# Patient Record
Sex: FEMALE | Race: WHITE | NOT HISPANIC OR LATINO
[De-identification: names, ages, dates, MRNs, and addresses within clinical notes are randomized per-mention and may not be internally consistent; named-entity substitution may affect disease eponyms.]

---

## 2017-01-14 ENCOUNTER — APPOINTMENT (OUTPATIENT)
Dept: INTERNAL MEDICINE | Facility: CLINIC | Age: 22
End: 2017-01-14

## 2017-01-19 ENCOUNTER — RX RENEWAL (OUTPATIENT)
Age: 22
End: 2017-01-19

## 2017-02-18 ENCOUNTER — APPOINTMENT (OUTPATIENT)
Dept: INTERNAL MEDICINE | Facility: CLINIC | Age: 22
End: 2017-02-18

## 2017-02-18 VITALS
TEMPERATURE: 98.6 F | HEIGHT: 67 IN | HEART RATE: 90 BPM | OXYGEN SATURATION: 99 % | RESPIRATION RATE: 12 BRPM | BODY MASS INDEX: 18.83 KG/M2 | DIASTOLIC BLOOD PRESSURE: 60 MMHG | WEIGHT: 120 LBS | SYSTOLIC BLOOD PRESSURE: 100 MMHG

## 2017-02-20 LAB
25(OH)D3 SERPL-MCNC: 40.3 NG/ML
ALBUMIN SERPL ELPH-MCNC: 4.9 G/DL
ALP BLD-CCNC: 43 U/L
ALT SERPL-CCNC: 11 U/L
ANION GAP SERPL CALC-SCNC: 13 MMOL/L
APPEARANCE: CLEAR
AST SERPL-CCNC: 14 U/L
BASOPHILS # BLD AUTO: 0.05 K/UL
BASOPHILS NFR BLD AUTO: 0.8 %
BILIRUB SERPL-MCNC: 0.9 MG/DL
BILIRUBIN URINE: NEGATIVE
BLOOD URINE: NEGATIVE
BUN SERPL-MCNC: 15 MG/DL
CALCIUM SERPL-MCNC: 9.7 MG/DL
CHLORIDE SERPL-SCNC: 104 MMOL/L
CHOLEST SERPL-MCNC: 142 MG/DL
CHOLEST/HDLC SERPL: 2 RATIO
CO2 SERPL-SCNC: 26 MMOL/L
COLOR: YELLOW
CREAT SERPL-MCNC: 0.67 MG/DL
EOSINOPHIL # BLD AUTO: 0.26 K/UL
EOSINOPHIL NFR BLD AUTO: 4.3 %
FOLATE SERPL-MCNC: 15.1 NG/ML
GLUCOSE QUALITATIVE U: NORMAL MG/DL
GLUCOSE SERPL-MCNC: 55 MG/DL
HBA1C MFR BLD HPLC: 5.1 %
HCT VFR BLD CALC: 42.2 %
HDLC SERPL-MCNC: 70 MG/DL
HGB BLD-MCNC: 14.2 G/DL
IMM GRANULOCYTES NFR BLD AUTO: 0.2 %
KETONES URINE: NEGATIVE
LDLC SERPL CALC-MCNC: 60 MG/DL
LEUKOCYTE ESTERASE URINE: NEGATIVE
LYMPHOCYTES # BLD AUTO: 2.34 K/UL
LYMPHOCYTES NFR BLD AUTO: 38.4 %
MAN DIFF?: NORMAL
MCHC RBC-ENTMCNC: 31.3 PG
MCHC RBC-ENTMCNC: 33.6 GM/DL
MCV RBC AUTO: 93.2 FL
MONOCYTES # BLD AUTO: 0.43 K/UL
MONOCYTES NFR BLD AUTO: 7.1 %
NEUTROPHILS # BLD AUTO: 3 K/UL
NEUTROPHILS NFR BLD AUTO: 49.2 %
NITRITE URINE: NEGATIVE
PH URINE: 7.5
PLATELET # BLD AUTO: 181 K/UL
POTASSIUM SERPL-SCNC: 4.4 MMOL/L
PROT SERPL-MCNC: 7.2 G/DL
PROTEIN URINE: NEGATIVE MG/DL
RBC # BLD: 4.53 M/UL
RBC # FLD: 12.3 %
SODIUM SERPL-SCNC: 143 MMOL/L
SPECIFIC GRAVITY URINE: 1.01
TRIGL SERPL-MCNC: 61 MG/DL
TSH SERPL-ACNC: 0.59 UIU/ML
URATE SERPL-MCNC: 3.6 MG/DL
UROBILINOGEN URINE: NORMAL MG/DL
VIT B12 SERPL-MCNC: 340 PG/ML
WBC # FLD AUTO: 6.09 K/UL

## 2017-06-12 ENCOUNTER — APPOINTMENT (OUTPATIENT)
Dept: INTERNAL MEDICINE | Facility: CLINIC | Age: 22
End: 2017-06-12

## 2017-06-12 VITALS
HEIGHT: 67 IN | BODY MASS INDEX: 18.83 KG/M2 | DIASTOLIC BLOOD PRESSURE: 60 MMHG | RESPIRATION RATE: 14 BRPM | WEIGHT: 120 LBS | SYSTOLIC BLOOD PRESSURE: 100 MMHG | TEMPERATURE: 98 F

## 2017-06-12 DIAGNOSIS — Z11.1 ENCOUNTER FOR SCREENING FOR RESPIRATORY TUBERCULOSIS: ICD-10-CM

## 2017-06-13 LAB — HBV SURFACE AB SER QL: NONREACTIVE

## 2017-06-14 LAB
MEV IGG FLD QL IA: 113 AU/ML
MEV IGG+IGM SER-IMP: POSITIVE
MUV AB SER-ACNC: NEGATIVE
MUV IGG SER QL IA: <5 AU/ML
RUBV IGG FLD-ACNC: 1.9 INDEX
RUBV IGG SER-IMP: POSITIVE
VZV AB TITR SER: POSITIVE
VZV IGG SER IF-ACNC: 1278 INDEX

## 2017-06-15 LAB
ADJUSTED MITOGEN: >10 IU/ML
ADJUSTED TB AG: 0 IU/ML
M TB IFN-G BLD-IMP: NEGATIVE
QUANTIFERON GOLD NIL: 0.02 IU/ML

## 2017-06-26 ENCOUNTER — APPOINTMENT (OUTPATIENT)
Dept: INTERNAL MEDICINE | Facility: CLINIC | Age: 22
End: 2017-06-26

## 2017-07-20 ENCOUNTER — RX RENEWAL (OUTPATIENT)
Age: 22
End: 2017-07-20

## 2017-07-20 ENCOUNTER — MEDICATION RENEWAL (OUTPATIENT)
Age: 22
End: 2017-07-20

## 2017-07-21 ENCOUNTER — MEDICATION RENEWAL (OUTPATIENT)
Age: 22
End: 2017-07-21

## 2017-09-18 ENCOUNTER — RX RENEWAL (OUTPATIENT)
Age: 22
End: 2017-09-18

## 2017-10-18 ENCOUNTER — APPOINTMENT (OUTPATIENT)
Dept: OBGYN | Facility: CLINIC | Age: 22
End: 2017-10-18

## 2017-10-19 ENCOUNTER — RX RENEWAL (OUTPATIENT)
Age: 22
End: 2017-10-19

## 2017-10-19 ENCOUNTER — TRANSCRIPTION ENCOUNTER (OUTPATIENT)
Age: 22
End: 2017-10-19

## 2017-11-07 ENCOUNTER — MEDICATION RENEWAL (OUTPATIENT)
Age: 22
End: 2017-11-07

## 2017-11-15 ENCOUNTER — APPOINTMENT (OUTPATIENT)
Dept: OBGYN | Facility: CLINIC | Age: 22
End: 2017-11-15
Payer: COMMERCIAL

## 2017-11-15 ENCOUNTER — LABORATORY RESULT (OUTPATIENT)
Age: 22
End: 2017-11-15

## 2017-11-15 VITALS
WEIGHT: 118 LBS | HEIGHT: 67 IN | BODY MASS INDEX: 18.52 KG/M2 | DIASTOLIC BLOOD PRESSURE: 72 MMHG | SYSTOLIC BLOOD PRESSURE: 115 MMHG

## 2017-11-15 PROCEDURE — 99395 PREV VISIT EST AGE 18-39: CPT

## 2017-11-17 LAB
C TRACH RRNA SPEC QL NAA+PROBE: NOT DETECTED
N GONORRHOEA RRNA SPEC QL NAA+PROBE: NOT DETECTED
SOURCE TP AMPLIFICATION: NORMAL

## 2017-11-18 ENCOUNTER — APPOINTMENT (OUTPATIENT)
Dept: INTERNAL MEDICINE | Facility: CLINIC | Age: 22
End: 2017-11-18

## 2017-11-19 ENCOUNTER — RX RENEWAL (OUTPATIENT)
Age: 22
End: 2017-11-19

## 2017-11-24 LAB — CYTOLOGY CVX/VAG DOC THIN PREP: NORMAL

## 2017-12-27 ENCOUNTER — APPOINTMENT (OUTPATIENT)
Dept: OBGYN | Facility: CLINIC | Age: 22
End: 2017-12-27
Payer: COMMERCIAL

## 2017-12-27 PROCEDURE — 57454 BX/CURETT OF CERVIX W/SCOPE: CPT

## 2018-01-02 ENCOUNTER — RX RENEWAL (OUTPATIENT)
Age: 23
End: 2018-01-02

## 2018-01-04 LAB — CORE LAB BIOPSY: NORMAL

## 2018-02-01 ENCOUNTER — RX RENEWAL (OUTPATIENT)
Age: 23
End: 2018-02-01

## 2018-03-01 ENCOUNTER — RX RENEWAL (OUTPATIENT)
Age: 23
End: 2018-03-01

## 2018-04-02 ENCOUNTER — RX RENEWAL (OUTPATIENT)
Age: 23
End: 2018-04-02

## 2018-05-09 ENCOUNTER — MEDICATION RENEWAL (OUTPATIENT)
Age: 23
End: 2018-05-09

## 2018-05-17 ENCOUNTER — MEDICATION RENEWAL (OUTPATIENT)
Age: 23
End: 2018-05-17

## 2018-05-31 ENCOUNTER — APPOINTMENT (OUTPATIENT)
Dept: NEUROLOGY | Facility: CLINIC | Age: 23
End: 2018-05-31
Payer: COMMERCIAL

## 2018-05-31 VITALS
SYSTOLIC BLOOD PRESSURE: 114 MMHG | HEIGHT: 67 IN | DIASTOLIC BLOOD PRESSURE: 67 MMHG | BODY MASS INDEX: 18.05 KG/M2 | WEIGHT: 115 LBS | HEART RATE: 58 BPM

## 2018-05-31 PROCEDURE — 99214 OFFICE O/P EST MOD 30 MIN: CPT

## 2018-06-02 ENCOUNTER — APPOINTMENT (OUTPATIENT)
Dept: INTERNAL MEDICINE | Facility: CLINIC | Age: 23
End: 2018-06-02

## 2018-06-14 ENCOUNTER — APPOINTMENT (OUTPATIENT)
Dept: OBGYN | Facility: CLINIC | Age: 23
End: 2018-06-14

## 2018-06-18 ENCOUNTER — APPOINTMENT (OUTPATIENT)
Dept: INTERNAL MEDICINE | Facility: CLINIC | Age: 23
End: 2018-06-18

## 2018-06-25 ENCOUNTER — APPOINTMENT (OUTPATIENT)
Dept: INTERNAL MEDICINE | Facility: CLINIC | Age: 23
End: 2018-06-25

## 2018-06-28 ENCOUNTER — APPOINTMENT (OUTPATIENT)
Dept: INTERNAL MEDICINE | Facility: CLINIC | Age: 23
End: 2018-06-28
Payer: MEDICAID

## 2018-06-28 VITALS
OXYGEN SATURATION: 98 % | HEART RATE: 69 BPM | WEIGHT: 120 LBS | TEMPERATURE: 98 F | RESPIRATION RATE: 14 BRPM | BODY MASS INDEX: 18.83 KG/M2 | HEIGHT: 67 IN | SYSTOLIC BLOOD PRESSURE: 100 MMHG | DIASTOLIC BLOOD PRESSURE: 60 MMHG

## 2018-06-28 PROCEDURE — 99213 OFFICE O/P EST LOW 20 MIN: CPT

## 2018-06-28 RX ORDER — DOXYCYCLINE HYCLATE 100 MG/1
100 TABLET ORAL
Qty: 20 | Refills: 0 | Status: DISCONTINUED | COMMUNITY
Start: 2017-08-17 | End: 2018-06-28

## 2018-06-28 RX ORDER — ESCITALOPRAM OXALATE 5 MG/1
5 TABLET ORAL
Qty: 30 | Refills: 0 | Status: DISCONTINUED | COMMUNITY
Start: 2016-12-16 | End: 2018-06-28

## 2018-06-28 NOTE — PHYSICAL EXAM
[No Acute Distress] : no acute distress [Well Nourished] : well nourished [Well Developed] : well developed [Well-Appearing] : well-appearing [No Respiratory Distress] : no respiratory distress  [Clear to Auscultation] : lungs were clear to auscultation bilaterally [Normal Rate] : normal rate  [Regular Rhythm] : with a regular rhythm [No Edema] : there was no peripheral edema [Soft] : abdomen soft [Non Tender] : non-tender [Normal Affect] : the affect was normal [Normal Insight/Judgement] : insight and judgment were intact

## 2018-06-28 NOTE — HISTORY OF PRESENT ILLNESS
[Friend] : friend [FreeTextEntry1] : requests labs [de-identified] : Pt is here for anxiety. was seeing a therapist and last seen in April but was not happy with her. Lexapro is not helping her. c/o stress with her boyfriend completing 2 months of rehab and stress at work. \par \par h/o seizures and followed by neurology.

## 2018-06-29 LAB
ALBUMIN SERPL ELPH-MCNC: 5.1 G/DL
ALP BLD-CCNC: 46 U/L
ALT SERPL-CCNC: 13 U/L
ANION GAP SERPL CALC-SCNC: 15 MMOL/L
AST SERPL-CCNC: 20 U/L
BASOPHILS # BLD AUTO: 0.03 K/UL
BASOPHILS NFR BLD AUTO: 0.5 %
BILIRUB SERPL-MCNC: 0.8 MG/DL
BUN SERPL-MCNC: 15 MG/DL
CALCIUM SERPL-MCNC: 10 MG/DL
CHLORIDE SERPL-SCNC: 100 MMOL/L
CO2 SERPL-SCNC: 25 MMOL/L
CREAT SERPL-MCNC: 0.73 MG/DL
EOSINOPHIL # BLD AUTO: 0.2 K/UL
EOSINOPHIL NFR BLD AUTO: 3.4 %
GLUCOSE SERPL-MCNC: 73 MG/DL
HCT VFR BLD CALC: 42.6 %
HGB BLD-MCNC: 13.9 G/DL
HIV1+2 AB SPEC QL IA.RAPID: NONREACTIVE
IMM GRANULOCYTES NFR BLD AUTO: 0.2 %
LYMPHOCYTES # BLD AUTO: 2.27 K/UL
LYMPHOCYTES NFR BLD AUTO: 38.1 %
MAN DIFF?: NORMAL
MCHC RBC-ENTMCNC: 30.5 PG
MCHC RBC-ENTMCNC: 32.6 GM/DL
MCV RBC AUTO: 93.4 FL
MONOCYTES # BLD AUTO: 0.68 K/UL
MONOCYTES NFR BLD AUTO: 11.4 %
NEUTROPHILS # BLD AUTO: 2.77 K/UL
NEUTROPHILS NFR BLD AUTO: 46.4 %
PLATELET # BLD AUTO: 213 K/UL
POTASSIUM SERPL-SCNC: 4.6 MMOL/L
PROT SERPL-MCNC: 8.3 G/DL
RBC # BLD: 4.56 M/UL
RBC # FLD: 12.7 %
SODIUM SERPL-SCNC: 140 MMOL/L
WBC # FLD AUTO: 5.96 K/UL

## 2018-06-30 LAB — TSH SERPL-ACNC: 0.76 UIU/ML

## 2018-07-16 ENCOUNTER — APPOINTMENT (OUTPATIENT)
Dept: INTERNAL MEDICINE | Facility: CLINIC | Age: 23
End: 2018-07-16

## 2018-08-02 ENCOUNTER — APPOINTMENT (OUTPATIENT)
Dept: OBGYN | Facility: CLINIC | Age: 23
End: 2018-08-02
Payer: MEDICAID

## 2018-08-02 VITALS
DIASTOLIC BLOOD PRESSURE: 70 MMHG | HEIGHT: 67 IN | WEIGHT: 120 LBS | BODY MASS INDEX: 18.83 KG/M2 | SYSTOLIC BLOOD PRESSURE: 122 MMHG

## 2018-08-02 DIAGNOSIS — N89.8 OTHER SPECIFIED NONINFLAMMATORY DISORDERS OF VAGINA: ICD-10-CM

## 2018-08-02 PROCEDURE — 99213 OFFICE O/P EST LOW 20 MIN: CPT

## 2018-08-03 LAB
C TRACH RRNA SPEC QL NAA+PROBE: NOT DETECTED
N GONORRHOEA RRNA SPEC QL NAA+PROBE: NOT DETECTED
SOURCE AMPLIFICATION: NORMAL

## 2018-08-06 ENCOUNTER — ASOB RESULT (OUTPATIENT)
Age: 23
End: 2018-08-06

## 2018-08-06 ENCOUNTER — APPOINTMENT (OUTPATIENT)
Dept: OBGYN | Facility: CLINIC | Age: 23
End: 2018-08-06
Payer: MEDICAID

## 2018-08-06 LAB
CANDIDA VAG CYTO: NOT DETECTED
G VAGINALIS+PREV SP MTYP VAG QL MICRO: DETECTED
T VAGINALIS VAG QL WET PREP: NOT DETECTED

## 2018-08-06 PROCEDURE — 76830 TRANSVAGINAL US NON-OB: CPT

## 2018-08-07 ENCOUNTER — OTHER (OUTPATIENT)
Age: 23
End: 2018-08-07

## 2018-09-18 ENCOUNTER — RX RENEWAL (OUTPATIENT)
Age: 23
End: 2018-09-18

## 2018-11-05 ENCOUNTER — RX RENEWAL (OUTPATIENT)
Age: 23
End: 2018-11-05

## 2018-12-13 ENCOUNTER — APPOINTMENT (OUTPATIENT)
Dept: INTERNAL MEDICINE | Facility: CLINIC | Age: 23
End: 2018-12-13

## 2018-12-19 ENCOUNTER — APPOINTMENT (OUTPATIENT)
Dept: INTERNAL MEDICINE | Facility: CLINIC | Age: 23
End: 2018-12-19

## 2018-12-26 ENCOUNTER — RX RENEWAL (OUTPATIENT)
Age: 23
End: 2018-12-26

## 2019-01-03 ENCOUNTER — APPOINTMENT (OUTPATIENT)
Dept: OBGYN | Facility: CLINIC | Age: 24
End: 2019-01-03

## 2019-04-03 ENCOUNTER — RX RENEWAL (OUTPATIENT)
Age: 24
End: 2019-04-03

## 2019-05-14 ENCOUNTER — RX RENEWAL (OUTPATIENT)
Age: 24
End: 2019-05-14

## 2019-05-24 ENCOUNTER — LABORATORY RESULT (OUTPATIENT)
Age: 24
End: 2019-05-24

## 2019-05-24 ENCOUNTER — APPOINTMENT (OUTPATIENT)
Dept: INTERNAL MEDICINE | Facility: CLINIC | Age: 24
End: 2019-05-24
Payer: MEDICAID

## 2019-05-24 VITALS
HEART RATE: 70 BPM | DIASTOLIC BLOOD PRESSURE: 62 MMHG | WEIGHT: 127 LBS | OXYGEN SATURATION: 98 % | TEMPERATURE: 97.9 F | BODY MASS INDEX: 19.93 KG/M2 | HEIGHT: 67 IN | SYSTOLIC BLOOD PRESSURE: 106 MMHG | RESPIRATION RATE: 14 BRPM

## 2019-05-24 DIAGNOSIS — R10.13 EPIGASTRIC PAIN: ICD-10-CM

## 2019-05-24 PROCEDURE — 99395 PREV VISIT EST AGE 18-39: CPT | Mod: 25

## 2019-05-24 PROCEDURE — 36415 COLL VENOUS BLD VENIPUNCTURE: CPT

## 2019-05-24 RX ORDER — MUPIROCIN 20 MG/G
2 OINTMENT TOPICAL
Qty: 22 | Refills: 0 | Status: DISCONTINUED | COMMUNITY
Start: 2017-12-04 | End: 2019-05-24

## 2019-05-24 RX ORDER — ONDANSETRON 4 MG/1
4 TABLET, ORALLY DISINTEGRATING ORAL
Qty: 9 | Refills: 0 | Status: DISCONTINUED | COMMUNITY
Start: 2017-10-19 | End: 2019-05-24

## 2019-05-24 NOTE — HEALTH RISK ASSESSMENT
[] : Yes [de-identified] : trying to quit - down to 2 cigs daily [de-identified] : marijuana [Change in mental status noted] : No change in mental status noted [Language] : denies difficulty with language [None] : None [With Family] : lives with family [Single] : single [Employed] : employed [Fully functional (bathing, dressing, toileting, transferring, walking, feeding)] : Fully functional (bathing, dressing, toileting, transferring, walking, feeding) [Feels Safe at Home] : Feels safe at home [Reports changes in hearing] : Reports no changes in hearing [Fully functional (using the telephone, shopping, preparing meals, housekeeping, doing laundry, using] : Fully functional and needs no help or supervision to perform IADLs (using the telephone, shopping, preparing meals, housekeeping, doing laundry, using transportation, managing medications and managing finances) [Reports changes in dental health] : Reports no changes in dental health [Reports changes in vision] : Reports no changes in vision

## 2019-05-24 NOTE — HISTORY OF PRESENT ILLNESS
[de-identified] : c/o epigastric pain for the last 6 months. Worse after she eats certain foods like spicy or greasy. Tums did not help. Getting worse. It is sore at night. No getting periods b/o IUD. She is going to have it removed because she doesn't like how she feels. drinks 3-4 drinks on weekends. \par \par Has not had a seizure for 4 yrs.

## 2019-05-24 NOTE — PHYSICAL EXAM
[No Acute Distress] : no acute distress [Well Developed] : well developed [Well Nourished] : well nourished [Well-Appearing] : well-appearing [Normal Sclera/Conjunctiva] : normal sclera/conjunctiva [PERRL] : pupils equal round and reactive to light [EOMI] : extraocular movements intact [Normal Outer Ear/Nose] : the outer ears and nose were normal in appearance [Normal Oropharynx] : the oropharynx was normal [No JVD] : no jugular venous distention [Supple] : supple [Thyroid Normal, No Nodules] : the thyroid was normal and there were no nodules present [No Lymphadenopathy] : no lymphadenopathy [No Respiratory Distress] : no respiratory distress  [Clear to Auscultation] : lungs were clear to auscultation bilaterally [No Accessory Muscle Use] : no accessory muscle use [Normal Rate] : normal rate  [Regular Rhythm] : with a regular rhythm [Normal S1, S2] : normal S1 and S2 [No Murmur] : no murmur heard [No Carotid Bruits] : no carotid bruits [No Abdominal Bruit] : a ~M bruit was not heard ~T in the abdomen [Pedal Pulses Present] : the pedal pulses are present [No Varicosities] : no varicosities [No Extremity Clubbing/Cyanosis] : no extremity clubbing/cyanosis [No Edema] : there was no peripheral edema [Soft] : abdomen soft [No Palpable Aorta] : no palpable aorta [Non Tender] : non-tender [Non-distended] : non-distended [No HSM] : no HSM [No Masses] : no abdominal mass palpated [Normal Posterior Cervical Nodes] : no posterior cervical lymphadenopathy [Normal Bowel Sounds] : normal bowel sounds [Normal Anterior Cervical Nodes] : no anterior cervical lymphadenopathy [No CVA Tenderness] : no CVA  tenderness [No Joint Swelling] : no joint swelling [No Spinal Tenderness] : no spinal tenderness [Grossly Normal Strength/Tone] : grossly normal strength/tone [No Rash] : no rash [Coordination Grossly Intact] : coordination grossly intact [Normal Gait] : normal gait [No Focal Deficits] : no focal deficits [Deep Tendon Reflexes (DTR)] : deep tendon reflexes were 2+ and symmetric [Normal Insight/Judgement] : insight and judgment were intact [Normal Affect] : the affect was normal

## 2019-05-25 LAB
25(OH)D3 SERPL-MCNC: 26.8 NG/ML
ALBUMIN SERPL ELPH-MCNC: 4.8 G/DL
ALP BLD-CCNC: 41 U/L
ALT SERPL-CCNC: 6 U/L
ANION GAP SERPL CALC-SCNC: 13 MMOL/L
APPEARANCE: CLEAR
AST SERPL-CCNC: 11 U/L
BASOPHILS # BLD AUTO: 0.06 K/UL
BASOPHILS NFR BLD AUTO: 0.7 %
BILIRUB SERPL-MCNC: 0.9 MG/DL
BILIRUBIN URINE: NEGATIVE
BLOOD URINE: NEGATIVE
BUN SERPL-MCNC: 16 MG/DL
CALCIUM SERPL-MCNC: 9.4 MG/DL
CHLORIDE SERPL-SCNC: 103 MMOL/L
CHOLEST SERPL-MCNC: 159 MG/DL
CHOLEST/HDLC SERPL: 2.3 RATIO
CO2 SERPL-SCNC: 24 MMOL/L
COLOR: YELLOW
CREAT SERPL-MCNC: 0.64 MG/DL
EOSINOPHIL # BLD AUTO: 0.21 K/UL
EOSINOPHIL NFR BLD AUTO: 2.6 %
ESTIMATED AVERAGE GLUCOSE: 88 MG/DL
FOLATE SERPL-MCNC: 18.2 NG/ML
GLUCOSE QUALITATIVE U: NEGATIVE
GLUCOSE SERPL-MCNC: 75 MG/DL
HBA1C MFR BLD HPLC: 4.7 %
HCT VFR BLD CALC: 40.2 %
HDLC SERPL-MCNC: 68 MG/DL
HGB BLD-MCNC: 13.4 G/DL
HIV1+2 AB SPEC QL IA.RAPID: NONREACTIVE
IMM GRANULOCYTES NFR BLD AUTO: 0.1 %
KETONES URINE: ABNORMAL
LDLC SERPL CALC-MCNC: 78 MG/DL
LEUKOCYTE ESTERASE URINE: NEGATIVE
LYMPHOCYTES # BLD AUTO: 1.91 K/UL
LYMPHOCYTES NFR BLD AUTO: 23.8 %
MAN DIFF?: NORMAL
MCHC RBC-ENTMCNC: 31 PG
MCHC RBC-ENTMCNC: 33.3 GM/DL
MCV RBC AUTO: 93.1 FL
MONOCYTES # BLD AUTO: 0.61 K/UL
MONOCYTES NFR BLD AUTO: 7.6 %
NEUTROPHILS # BLD AUTO: 5.22 K/UL
NEUTROPHILS NFR BLD AUTO: 65.2 %
NITRITE URINE: NEGATIVE
PH URINE: 6.5
PLATELET # BLD AUTO: 182 K/UL
POTASSIUM SERPL-SCNC: 4.5 MMOL/L
PROT SERPL-MCNC: 7 G/DL
PROTEIN URINE: ABNORMAL
RBC # BLD: 4.32 M/UL
RBC # FLD: 11.5 %
SODIUM SERPL-SCNC: 140 MMOL/L
SPECIFIC GRAVITY URINE: 1.03
TRIGL SERPL-MCNC: 67 MG/DL
TSH SERPL-ACNC: 0.86 UIU/ML
URATE SERPL-MCNC: 4.3 MG/DL
UROBILINOGEN URINE: NORMAL
VIT B12 SERPL-MCNC: 365 PG/ML
WBC # FLD AUTO: 8.02 K/UL

## 2019-06-17 ENCOUNTER — APPOINTMENT (OUTPATIENT)
Dept: OBGYN | Facility: CLINIC | Age: 24
End: 2019-06-17

## 2019-06-20 ENCOUNTER — RX RENEWAL (OUTPATIENT)
Age: 24
End: 2019-06-20

## 2019-06-20 ENCOUNTER — MEDICATION RENEWAL (OUTPATIENT)
Age: 24
End: 2019-06-20

## 2019-07-11 ENCOUNTER — APPOINTMENT (OUTPATIENT)
Dept: OBGYN | Facility: CLINIC | Age: 24
End: 2019-07-11
Payer: MEDICAID

## 2019-07-11 DIAGNOSIS — Z30.432 ENCOUNTER FOR REMOVAL OF INTRAUTERINE CONTRACEPTIVE DEVICE: ICD-10-CM

## 2019-07-11 PROCEDURE — 58301 REMOVE INTRAUTERINE DEVICE: CPT

## 2019-07-11 NOTE — PROCEDURE
[IUD Removal] : IUD [Patient] : patient [Speculum Placed] : a speculum was placed in the vagina [Strings Exposed - Forceps] : forceps were placed in the endocervical canal to expose the strings [IUD Discarded] : discarded [Tolerated Well] : the patient tolerated the procedure well [de-identified] : mood lability

## 2019-07-17 ENCOUNTER — RX RENEWAL (OUTPATIENT)
Age: 24
End: 2019-07-17

## 2019-08-15 ENCOUNTER — RX RENEWAL (OUTPATIENT)
Age: 24
End: 2019-08-15

## 2019-09-11 ENCOUNTER — APPOINTMENT (OUTPATIENT)
Dept: OBGYN | Facility: CLINIC | Age: 24
End: 2019-09-11
Payer: MEDICAID

## 2019-09-11 VITALS
SYSTOLIC BLOOD PRESSURE: 116 MMHG | BODY MASS INDEX: 19.93 KG/M2 | HEIGHT: 67 IN | WEIGHT: 127 LBS | DIASTOLIC BLOOD PRESSURE: 73 MMHG

## 2019-09-11 DIAGNOSIS — Z87.42 PERSONAL HISTORY OF OTHER DISEASES OF THE FEMALE GENITAL TRACT: ICD-10-CM

## 2019-09-11 PROCEDURE — 99213 OFFICE O/P EST LOW 20 MIN: CPT

## 2019-09-16 ENCOUNTER — RX RENEWAL (OUTPATIENT)
Age: 24
End: 2019-09-16

## 2019-09-17 ENCOUNTER — APPOINTMENT (OUTPATIENT)
Dept: OBGYN | Facility: CLINIC | Age: 24
End: 2019-09-17

## 2019-09-23 ENCOUNTER — RX RENEWAL (OUTPATIENT)
Age: 24
End: 2019-09-23

## 2019-09-30 ENCOUNTER — APPOINTMENT (OUTPATIENT)
Dept: OBGYN | Facility: CLINIC | Age: 24
End: 2019-09-30

## 2019-11-14 ENCOUNTER — APPOINTMENT (OUTPATIENT)
Dept: INTERNAL MEDICINE | Facility: CLINIC | Age: 24
End: 2019-11-14

## 2019-11-29 ENCOUNTER — RX RENEWAL (OUTPATIENT)
Age: 24
End: 2019-11-29

## 2019-12-09 ENCOUNTER — RX RENEWAL (OUTPATIENT)
Age: 24
End: 2019-12-09

## 2019-12-10 ENCOUNTER — APPOINTMENT (OUTPATIENT)
Dept: NEUROLOGY | Facility: CLINIC | Age: 24
End: 2019-12-10

## 2020-02-12 ENCOUNTER — RX RENEWAL (OUTPATIENT)
Age: 25
End: 2020-02-12

## 2020-03-08 ENCOUNTER — RX RENEWAL (OUTPATIENT)
Age: 25
End: 2020-03-08

## 2020-04-01 ENCOUNTER — RESULT CHARGE (OUTPATIENT)
Age: 25
End: 2020-04-01

## 2020-04-01 ENCOUNTER — APPOINTMENT (OUTPATIENT)
Dept: OBGYN | Facility: CLINIC | Age: 25
End: 2020-04-01
Payer: MEDICAID

## 2020-04-01 PROCEDURE — 81003 URINALYSIS AUTO W/O SCOPE: CPT | Mod: QW

## 2020-04-01 PROCEDURE — 99211 OFF/OP EST MAY X REQ PHY/QHP: CPT

## 2020-04-02 LAB
BILIRUB UR QL STRIP: NORMAL
GLUCOSE UR-MCNC: NORMAL
HCG UR QL: 0.2 EU/DL
HGB UR QL STRIP.AUTO: NORMAL
KETONES UR-MCNC: NORMAL
LEUKOCYTE ESTERASE UR QL STRIP: NORMAL
NITRITE UR QL STRIP: NORMAL
PH UR STRIP: 6
PROT UR STRIP-MCNC: NORMAL
SP GR UR STRIP: >=1.03

## 2020-04-03 LAB — BACTERIA UR CULT: NORMAL

## 2020-04-06 ENCOUNTER — RX RENEWAL (OUTPATIENT)
Age: 25
End: 2020-04-06

## 2020-05-07 ENCOUNTER — RX RENEWAL (OUTPATIENT)
Age: 25
End: 2020-05-07

## 2020-05-11 ENCOUNTER — APPOINTMENT (OUTPATIENT)
Dept: INTERNAL MEDICINE | Facility: CLINIC | Age: 25
End: 2020-05-11
Payer: MEDICAID

## 2020-05-11 DIAGNOSIS — K21.9 GASTRO-ESOPHAGEAL REFLUX DISEASE W/OUT ESOPHAGITIS: ICD-10-CM

## 2020-05-11 PROCEDURE — 99442: CPT

## 2020-05-11 RX ORDER — OMEPRAZOLE 40 MG/1
40 CAPSULE, DELAYED RELEASE ORAL
Qty: 30 | Refills: 2 | Status: DISCONTINUED | COMMUNITY
Start: 2017-11-17 | End: 2020-05-11

## 2020-05-11 RX ORDER — METRONIDAZOLE 500 MG/1
500 TABLET ORAL TWICE DAILY
Qty: 14 | Refills: 0 | Status: DISCONTINUED | COMMUNITY
Start: 2018-08-06 | End: 2020-05-11

## 2020-05-11 NOTE — HISTORY OF PRESENT ILLNESS
[FreeTextEntry1] : anxiety and depression [Verbal consent obtained from patient] : the patient, [unfilled] [de-identified] : Pt has anxiety and depression\par \par Has gerd and omeprazole not working as well and she has to take tums. Also has IBS. When she eats anything acidic she gets heartburn. Her mother takes pantoprazole and pt would like to be switched to that. She has not been to GI. \par \par She also has anxiety and taking lexapro which she needs refilled. She has been taking 20 mg. She does to a therapist through telemedicine. Last time was about 2 weeks ago. \par \par She sees a neurologist for her seizure disorder and she takes keppra.

## 2020-06-01 ENCOUNTER — RX RENEWAL (OUTPATIENT)
Age: 25
End: 2020-06-01

## 2020-06-05 ENCOUNTER — RX RENEWAL (OUTPATIENT)
Age: 25
End: 2020-06-05

## 2020-07-01 ENCOUNTER — RX RENEWAL (OUTPATIENT)
Age: 25
End: 2020-07-01

## 2020-07-03 ENCOUNTER — RX RENEWAL (OUTPATIENT)
Age: 25
End: 2020-07-03

## 2020-07-07 ENCOUNTER — RX RENEWAL (OUTPATIENT)
Age: 25
End: 2020-07-07

## 2020-07-10 ENCOUNTER — RX RENEWAL (OUTPATIENT)
Age: 25
End: 2020-07-10

## 2020-07-13 ENCOUNTER — APPOINTMENT (OUTPATIENT)
Dept: NEUROLOGY | Facility: CLINIC | Age: 25
End: 2020-07-13

## 2020-07-24 ENCOUNTER — APPOINTMENT (OUTPATIENT)
Dept: NEUROLOGY | Facility: CLINIC | Age: 25
End: 2020-07-24

## 2020-07-24 NOTE — DISCUSSION/SUMMARY
[Medically Refractory (seizure within the last year)] : Medically Refractory (seizure within the last year) [Risks Associated with Driving/[place] Law] : As per my usual protocol, the patient was advised in regards to risks and driving privileges associated with the [place] Guidelines.  [Compliance with Medications] : The importance of compliance with medications was reinforced. [Safety Recommendations] : The patient was advised in regards to the risk of seizures and general seizure safety recommendations including not to be bathing alone, climbing to high places and operating heavy machinery. [Medication Side Effects] : High frequency and serious potential medication adverse effects were reviewed with the patient, including but not exclusive to psychiatric effects.  Information sheets on medication side effects were made available to the patient in our clinic.  The patient or advocate agrees to notify us for any concerns. [Teratogenicity] : Risks associated with AED use in pregnancy, teratogenicity and methods of contraception were discussed.  [FreeTextEntry1] : Patient with history of seizures as  a child and recent GTCs one at least in setting of benzo withdrawal. \par last event Feb 2015 \par mild mood effects from Keppra according to mother\par difficulty staying asleep (some anxiety)\par currently in substance abuse program (Anorexia in last year of highs school) related to break up with patient)\par sexually active (on OCP) does not use baririer (counselled on safe sex)\par \par Plan:\par started Lexapro 5mg (titrating up to 10mg in 2 weeks (sustabnce abuseProgram)\par counselled on ETOH/ substance abuse (may have  reach out)\par - reviewed seizure triggers\par -continue Keppra \par -on OCP\par -continue Folic acid 2mg daily\par - relaxation techniques discussed to help with anxiety/stress\par - annual labs\par -ambu eeg to r/o subclinical sz ( for rdriving)\par - follow up in 4-6  months\par \par

## 2020-07-24 NOTE — HISTORY OF PRESENT ILLNESS
[Febrile Seizures] : febrile seizures [FreeTextEntry1] : ***UPDATE:7/24/20***\par Appointment was conducted by phone at request of patient in place of a follow up office visit due to heightened concern for Corona virus infection risk.\par Verbal consent given on Jul 24, 2020 10:08 AM by the patient Ms. CATRACHITO SAINZ Jul 23 1995 who understands that the telephone visit will be charged to insurance and may involve co-pay for patient\par Nurse Practitioner location:home\par Patient location:home\par Individuals on call: DARIUS Pedro, Ms. CATRACHITO SAINZ\par \par Ms Catrachito Sainz was last seen in May 2018\par \par UPDATE: 5/31/18\par Doing well, no reported seizures\par mood good on Levetiracetam\par \par The patient is 25 year-old right handed woman with seizures starting at age 1, with generalized tonic clonic seizures.  She was placed on medication at age 1, and at some point was diagnosed with petit mal around age 3, with abnormal EEG.  She started on Zarontin but was allergic.  She was tried on Dilantin, phenobarbital, neurontin, Topamax and none worked well and there were behavioral side effects.\par \par She was stable on lamictal by age 16 to the point where it was tapered last year at age 18 since she was doing well.  No EEGs were performed.  She had an MRI at [facility] but magdaleno not have result, from 2014.\par \par She had one GTC at a club with strobe lights while on Lamictal in 2012.  She feels jumpy when she looks at strobe lights.  \par \par It is unclear if she is continuing to have petit mal spells.  Her most recent convulsion was  in the setting of Xanax withdrawal and it was severe with three repetitive seizures..  In November 2014, while using drugs, but not withdrawing, she had a witnessed seizure at work and she had soft tissue injuries.   \par Last seizure in Feb 2015 \par \par She feels irritable on Keppra after this was started in November 2014, and would like to consider going back to Lamictal since she states her mood was better.\par \par She was born with umbilical cord around her neck, and she had some special education classes.  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

## 2020-07-24 NOTE — PHYSICAL EXAM
[General Appearance - Alert] : alert [General Appearance - In No Acute Distress] : in no acute distress [Oriented To Time, Place, And Person] : oriented to person, place, and time [Impaired Insight] : insight and judgment were intact [Affect] : the affect was normal [Person] : oriented to person [Place] : oriented to place [Time] : oriented to time [Short Term Intact] : short term memory intact [Registration Intact] : recent registration memory intact [Span Intact] : the attention span was normal [Remote Intact] : remote memory intact [Concentration Intact] : normal concentrating ability [Visual Intact] : visual attention was ~T not ~L decreased [Repeating Phrases] : no difficulty repeating a phrase [Naming Objects] : no difficulty naming common objects [Fluency] : fluency intact [Writing A Sentence] : no difficulty writing a sentence [Reading] : reading intact [Comprehension] : comprehension intact [Past History] : adequate knowledge of personal past history [Current Events] : adequate knowledge of current events [Cranial Nerves Optic (II)] : visual acuity intact bilaterally,  visual fields full to confrontation, pupils equal round and reactive to light [Vocabulary] : adequate range of vocabulary [Cranial Nerves Oculomotor (III)] : extraocular motion intact [Cranial Nerves Trigeminal (V)] : facial sensation intact symmetrically [Cranial Nerves Facial (VII)] : face symmetrical [Cranial Nerves Vestibulocochlear (VIII)] : hearing was intact bilaterally [Cranial Nerves Glossopharyngeal (IX)] : tongue and palate midline [Cranial Nerves Accessory (XI - Cranial And Spinal)] : head turning and shoulder shrug symmetric [Cranial Nerves Hypoglossal (XII)] : there was no tongue deviation with protrusion [Motor Tone] : muscle tone was normal in all four extremities [Motor Strength] : muscle strength was normal in all four extremities [No Muscle Atrophy] : normal bulk in all four extremities [Sensation Tactile Decrease] : light touch was intact [Sensation Pain / Temperature Decrease] : pain and temperature was intact [Proprioception] : proprioception was intact [Sensation Vibration Decrease] : vibration was intact [Abnormal Walk] : normal gait [Balance] : balance was intact [2+] : Ankle jerk left 2+ [Sclera] : the sclera and conjunctiva were normal [Extraocular Movements] : extraocular movements were intact [PERRL With Normal Accommodation] : pupils were equal in size, round, reactive to light, with normal accommodation [Outer Ear] : the ears and nose were normal in appearance [Oropharynx] : the oropharynx was normal [Past-pointing] : there was no past-pointing [Tremor] : no tremor present [Plantar Reflex Right Only] : normal on the right [Plantar Reflex Left Only] : normal on the left

## 2020-07-30 ENCOUNTER — RX RENEWAL (OUTPATIENT)
Age: 25
End: 2020-07-30

## 2020-07-31 ENCOUNTER — RX RENEWAL (OUTPATIENT)
Age: 25
End: 2020-07-31

## 2020-08-07 ENCOUNTER — APPOINTMENT (OUTPATIENT)
Dept: NEUROLOGY | Facility: CLINIC | Age: 25
End: 2020-08-07

## 2020-08-07 NOTE — DISCUSSION/SUMMARY
[FreeTextEntry1] : Patient with history of seizures as  a child and recent GTCs one at least in setting of benzo withdrawal. \par last event Feb 2015 \par mild mood effects from Keppra according to mother\par difficulty staying asleep (some anxiety)\par currently in substance abuse program (Anorexia in last year of highs school) related to break up with patient)\par sexually active (on OCP) does not use baririer (counselled on safe sex)\par \par Plan:\par started Lexapro 5mg (titrating up to 10mg in 2 weeks (sustabnce abuseProgram)\par counselled on ETOH/ substance abuse (may have  reach out)\par - reviewed seizure triggers\par -continue Keppra \par -on OCP\par -continue Folic acid 2mg daily\par - relaxation techniques discussed to help with anxiety/stress\par - annual labs\par -ambu eeg to r/o subclinical sz ( for rdriving)\par - follow up in 4-6  months\par \par   [Medically Refractory (seizure within the last year)] : Medically Refractory (seizure within the last year) [[place]isks [place] with D[place]] : As per my usual protocol, the patient was advised in regards to risks and driving privileges associated with the [place] Guidelines.  [Safety Recommendations] : The patient was advised in regards to the risk of seizures and general seizure safety recommendations including not to be bathing alone, climbing to high places and operating heavy machinery. [Medication Side Effects] : High frequency and serious potential medication adverse effects were reviewed with the patient, including but not exclusive to psychiatric effects.  Information sheets on medication side effects were made available to the patient in our clinic.  The patient or advocate agrees to notify us for any concerns. [Compliance with Medications] : The importance of compliance with medications was reinforced. [Teratogenicity] : Risks associated with AED use in pregnancy, teratogenicity and methods of contraception were discussed.

## 2020-08-07 NOTE — HISTORY OF PRESENT ILLNESS
[FreeTextEntry1] : ***UPDATE:7/24/20***\par Appointment was conducted by phone at request of patient in place of a follow up office visit due to heightened concern for Corona virus infection risk.\par Verbal consent given on Jul 24, 2020 10:08 AM by the patient Ms. CATRACHITO SAINZ Jul 23 1995 who understands that the telephone visit will be charged to insurance and may involve co-pay for patient\par Nurse Practitioner location:home\par Patient location:home\par Individuals on call: DARIUS Pedro, Ms. CATRACHITO SAINZ\par \par Ms Catrachito Sainz was last seen in May 2018\par \par UPDATE: 5/31/18\par Doing well, no reported seizures\par mood good on Levetiracetam\par \par The patient is 25 year-old right handed woman with seizures starting at age 1, with generalized tonic clonic seizures.  She was placed on medication at age 1, and at some point was diagnosed with petit mal around age 3, with abnormal EEG.  She started on Zarontin but was allergic.  She was tried on Dilantin, phenobarbital, neurontin, Topamax and none worked well and there were behavioral side effects.\par \par She was stable on lamictal by age 16 to the point where it was tapered last year at age 18 since she was doing well.  No EEGs were performed.  She had an MRI at [facility] but magdaleno not have result, from 2014.\par \par She had one GTC at a club with strobe lights while on Lamictal in 2012.  She feels jumpy when she looks at strobe lights.  \par \par It is unclear if she is continuing to have petit mal spells.  Her most recent convulsion was  in the setting of Xanax withdrawal and it was severe with three repetitive seizures..  In November 2014, while using drugs, but not withdrawing, she had a witnessed seizure at work and she had soft tissue injuries.   \par Last seizure in Feb 2015 \par \par She feels irritable on Keppra after this was started in November 2014, and would like to consider going back to Lamictal since she states her mood was better.\par \par She was born with umbilical cord around her neck, and she had some special education classes.  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  [Febrile Seizures] : febrile seizures

## 2020-08-07 NOTE — PHYSICAL EXAM
[General Appearance - Alert] : alert [General Appearance - In No Acute Distress] : in no acute distress [Impaired Insight] : insight and judgment were intact [Oriented To Time, Place, And Person] : oriented to person, place, and time [Affect] : the affect was normal [Person] : oriented to person [Place] : oriented to place [Time] : oriented to time [Remote Intact] : remote memory intact [Short Term Intact] : short term memory intact [Concentration Intact] : normal concentrating ability [Span Intact] : the attention span was normal [Registration Intact] : recent registration memory intact [Visual Intact] : visual attention was ~T not ~L decreased [Naming Objects] : no difficulty naming common objects [Repeating Phrases] : no difficulty repeating a phrase [Comprehension] : comprehension intact [Fluency] : fluency intact [Writing A Sentence] : no difficulty writing a sentence [Past History] : adequate knowledge of personal past history [Reading] : reading intact [Current Events] : adequate knowledge of current events [Cranial Nerves Optic (II)] : visual acuity intact bilaterally,  visual fields full to confrontation, pupils equal round and reactive to light [Cranial Nerves Oculomotor (III)] : extraocular motion intact [Cranial Nerves Trigeminal (V)] : facial sensation intact symmetrically [Vocabulary] : adequate range of vocabulary [Cranial Nerves Vestibulocochlear (VIII)] : hearing was intact bilaterally [Cranial Nerves Facial (VII)] : face symmetrical [Cranial Nerves Glossopharyngeal (IX)] : tongue and palate midline [Motor Tone] : muscle tone was normal in all four extremities [Cranial Nerves Hypoglossal (XII)] : there was no tongue deviation with protrusion [Cranial Nerves Accessory (XI - Cranial And Spinal)] : head turning and shoulder shrug symmetric [Motor Strength] : muscle strength was normal in all four extremities [Sensation Tactile Decrease] : light touch was intact [No Muscle Atrophy] : normal bulk in all four extremities [Sensation Pain / Temperature Decrease] : pain and temperature was intact [Abnormal Walk] : normal gait [Sensation Vibration Decrease] : vibration was intact [Balance] : balance was intact [Proprioception] : proprioception was intact [Tremor] : no tremor present [Past-pointing] : there was no past-pointing [2+] : Ankle jerk right 2+ [Plantar Reflex Right Only] : normal on the right [Plantar Reflex Left Only] : normal on the left [Sclera] : the sclera and conjunctiva were normal [PERRL With Normal Accommodation] : pupils were equal in size, round, reactive to light, with normal accommodation [Oropharynx] : the oropharynx was normal [Extraocular Movements] : extraocular movements were intact [Outer Ear] : the ears and nose were normal in appearance

## 2020-08-26 ENCOUNTER — RX RENEWAL (OUTPATIENT)
Age: 25
End: 2020-08-26

## 2020-09-05 ENCOUNTER — RX RENEWAL (OUTPATIENT)
Age: 25
End: 2020-09-05

## 2020-09-14 ENCOUNTER — RX RENEWAL (OUTPATIENT)
Age: 25
End: 2020-09-14

## 2020-09-24 ENCOUNTER — APPOINTMENT (OUTPATIENT)
Dept: INTERNAL MEDICINE | Facility: CLINIC | Age: 25
End: 2020-09-24

## 2020-09-30 ENCOUNTER — APPOINTMENT (OUTPATIENT)
Dept: OBGYN | Facility: CLINIC | Age: 25
End: 2020-09-30
Payer: MEDICAID

## 2020-09-30 VITALS
DIASTOLIC BLOOD PRESSURE: 81 MMHG | SYSTOLIC BLOOD PRESSURE: 152 MMHG | WEIGHT: 128 LBS | HEIGHT: 68 IN | BODY MASS INDEX: 19.4 KG/M2

## 2020-09-30 DIAGNOSIS — Z01.419 ENCOUNTER FOR GYNECOLOGICAL EXAMINATION (GENERAL) (ROUTINE) W/OUT ABNORMAL FINDINGS: ICD-10-CM

## 2020-09-30 PROCEDURE — 99395 PREV VISIT EST AGE 18-39: CPT

## 2020-09-30 PROCEDURE — 99212 OFFICE O/P EST SF 10 MIN: CPT | Mod: 25

## 2020-09-30 NOTE — HISTORY OF PRESENT ILLNESS
[FreeTextEntry1] : pt is a 24 y/o p0 lmp 9/22 presents for annual gyn visit with concern of fishy odor and desires std testing [Regular Cycle Intervals] : periods have been regular

## 2020-10-01 ENCOUNTER — RX RENEWAL (OUTPATIENT)
Age: 25
End: 2020-10-01

## 2020-10-01 LAB
HBV SURFACE AG SER QL: NONREACTIVE
HCV AB SER QL: NONREACTIVE
HCV S/CO RATIO: 0.08 S/CO
HIV1+2 AB SPEC QL IA.RAPID: NONREACTIVE

## 2020-10-02 LAB
C TRACH RRNA SPEC QL NAA+PROBE: NOT DETECTED
CANDIDA VAG CYTO: NOT DETECTED
G VAGINALIS+PREV SP MTYP VAG QL MICRO: DETECTED
HPV HIGH+LOW RISK DNA PNL CVX: NOT DETECTED
N GONORRHOEA RRNA SPEC QL NAA+PROBE: NOT DETECTED
SOURCE TP AMPLIFICATION: NORMAL
T PALLIDUM AB SER QL IA: NEGATIVE
T VAGINALIS VAG QL WET PREP: NOT DETECTED

## 2020-10-06 LAB — CYTOLOGY CVX/VAG DOC THIN PREP: NORMAL

## 2020-10-07 RX ORDER — DROSPIRENONE/ETHINYL ESTRADIOL/LEVOMEFOLATE CALCIUM AND LEVOMEFOLATE CALCIUM 3-0.03(21)
3-0.03-0.451 KIT ORAL
Qty: 90 | Refills: 3 | Status: DISCONTINUED | COMMUNITY
Start: 2020-09-30 | End: 2020-10-07

## 2020-10-08 ENCOUNTER — APPOINTMENT (OUTPATIENT)
Dept: INTERNAL MEDICINE | Facility: CLINIC | Age: 25
End: 2020-10-08

## 2020-10-10 ENCOUNTER — RX RENEWAL (OUTPATIENT)
Age: 25
End: 2020-10-10

## 2020-10-31 ENCOUNTER — RX RENEWAL (OUTPATIENT)
Age: 25
End: 2020-10-31

## 2020-11-10 ENCOUNTER — RX RENEWAL (OUTPATIENT)
Age: 25
End: 2020-11-10

## 2020-12-10 ENCOUNTER — RX RENEWAL (OUTPATIENT)
Age: 25
End: 2020-12-10

## 2021-01-05 ENCOUNTER — TRANSCRIPTION ENCOUNTER (OUTPATIENT)
Age: 26
End: 2021-01-05

## 2021-01-07 ENCOUNTER — RX RENEWAL (OUTPATIENT)
Age: 26
End: 2021-01-07

## 2021-01-09 ENCOUNTER — RX RENEWAL (OUTPATIENT)
Age: 26
End: 2021-01-09

## 2021-01-27 ENCOUNTER — APPOINTMENT (OUTPATIENT)
Dept: INTERNAL MEDICINE | Facility: CLINIC | Age: 26
End: 2021-01-27

## 2021-02-04 ENCOUNTER — RX RENEWAL (OUTPATIENT)
Age: 26
End: 2021-02-04

## 2021-02-13 ENCOUNTER — RX RENEWAL (OUTPATIENT)
Age: 26
End: 2021-02-13

## 2021-03-05 ENCOUNTER — RX RENEWAL (OUTPATIENT)
Age: 26
End: 2021-03-05

## 2021-03-17 ENCOUNTER — RX RENEWAL (OUTPATIENT)
Age: 26
End: 2021-03-17

## 2021-04-01 ENCOUNTER — RX RENEWAL (OUTPATIENT)
Age: 26
End: 2021-04-01

## 2021-04-28 ENCOUNTER — RX RENEWAL (OUTPATIENT)
Age: 26
End: 2021-04-28

## 2021-05-19 ENCOUNTER — RX RENEWAL (OUTPATIENT)
Age: 26
End: 2021-05-19

## 2021-05-22 ENCOUNTER — APPOINTMENT (OUTPATIENT)
Dept: INTERNAL MEDICINE | Facility: CLINIC | Age: 26
End: 2021-05-22

## 2021-06-14 ENCOUNTER — RX RENEWAL (OUTPATIENT)
Age: 26
End: 2021-06-14

## 2021-06-17 ENCOUNTER — RX RENEWAL (OUTPATIENT)
Age: 26
End: 2021-06-17

## 2021-07-09 ENCOUNTER — RX RENEWAL (OUTPATIENT)
Age: 26
End: 2021-07-09

## 2021-07-19 ENCOUNTER — APPOINTMENT (OUTPATIENT)
Dept: INTERNAL MEDICINE | Facility: CLINIC | Age: 26
End: 2021-07-19

## 2021-07-26 ENCOUNTER — TRANSCRIPTION ENCOUNTER (OUTPATIENT)
Age: 26
End: 2021-07-26

## 2021-07-26 ENCOUNTER — APPOINTMENT (OUTPATIENT)
Dept: INTERNAL MEDICINE | Facility: CLINIC | Age: 26
End: 2021-07-26
Payer: MEDICAID

## 2021-07-30 ENCOUNTER — RX RENEWAL (OUTPATIENT)
Age: 26
End: 2021-07-30

## 2021-08-02 ENCOUNTER — APPOINTMENT (OUTPATIENT)
Dept: NEUROLOGY | Facility: CLINIC | Age: 26
End: 2021-08-02
Payer: MEDICAID

## 2021-08-02 PROCEDURE — 95816 EEG AWAKE AND DROWSY: CPT

## 2021-08-03 PROCEDURE — 95708 EEG WO VID EA 12-26HR UNMNTR: CPT

## 2021-08-04 PROCEDURE — 95721 EEG PHY/QHP>36<60 HR W/O VID: CPT

## 2021-08-04 PROCEDURE — 95700 EEG CONT REC W/VID EEG TECH: CPT

## 2021-08-04 PROCEDURE — 95708 EEG WO VID EA 12-26HR UNMNTR: CPT

## 2021-08-06 ENCOUNTER — APPOINTMENT (OUTPATIENT)
Dept: INTERNAL MEDICINE | Facility: CLINIC | Age: 26
End: 2021-08-06
Payer: MEDICAID

## 2021-08-06 VITALS
BODY MASS INDEX: 16.82 KG/M2 | DIASTOLIC BLOOD PRESSURE: 64 MMHG | RESPIRATION RATE: 14 BRPM | OXYGEN SATURATION: 99 % | SYSTOLIC BLOOD PRESSURE: 102 MMHG | HEART RATE: 97 BPM | WEIGHT: 111 LBS | HEIGHT: 68 IN | TEMPERATURE: 97.8 F

## 2021-08-06 DIAGNOSIS — R39.89 OTHER SYMPTOMS AND SIGNS INVOLVING THE GENITOURINARY SYSTEM: ICD-10-CM

## 2021-08-06 DIAGNOSIS — J45.909 UNSPECIFIED ASTHMA, UNCOMPLICATED: ICD-10-CM

## 2021-08-06 DIAGNOSIS — F41.9 ANXIETY DISORDER, UNSPECIFIED: ICD-10-CM

## 2021-08-06 PROCEDURE — 99214 OFFICE O/P EST MOD 30 MIN: CPT

## 2021-08-06 RX ORDER — ESCITALOPRAM OXALATE 10 MG/1
10 TABLET ORAL
Qty: 30 | Refills: 0 | Status: DISCONTINUED | COMMUNITY
Start: 2019-06-20 | End: 2021-08-06

## 2021-08-06 NOTE — HISTORY OF PRESENT ILLNESS
[FreeTextEntry1] : h/o seizure disorder [de-identified] : Pt with seizure disorder and last seizure was 2012. Has not driven in 4 yrs and has been very stable on keppra. Had EEG recently. She has neurology appt. \noemy Has uti symptoms with burning after urination and odor starting 1 week ago. No frequency. Was in a wet bathing suit. \noemy Has gerd and takes pantoprazole everyday. \par Her weight fluctuates a lot. \par Smokes cigarettes about 2 daily. Marijuana smokes every night before bed. No drugs now for 3 years. \par Has headache which is a pressure in right temple area on and off for last 6 months. Feels it right now. Feels like tension or tightness.

## 2021-08-07 LAB
APPEARANCE: CLEAR
BACTERIA: NEGATIVE
BILIRUBIN URINE: NEGATIVE
BLOOD URINE: NEGATIVE
COLOR: YELLOW
GLUCOSE QUALITATIVE U: NEGATIVE
HYALINE CASTS: 8 /LPF
KETONES URINE: NEGATIVE
LEUKOCYTE ESTERASE URINE: NEGATIVE
MICROSCOPIC-UA: NORMAL
NITRITE URINE: NEGATIVE
PH URINE: 7
PROTEIN URINE: NORMAL
RED BLOOD CELLS URINE: 1 /HPF
SPECIFIC GRAVITY URINE: 1.03
SQUAMOUS EPITHELIAL CELLS: >27 /HPF
URINE COMMENTS: NORMAL
UROBILINOGEN URINE: NORMAL
WHITE BLOOD CELLS URINE: 3 /HPF

## 2021-08-09 LAB — BACTERIA UR CULT: ABNORMAL

## 2021-08-10 ENCOUNTER — TRANSCRIPTION ENCOUNTER (OUTPATIENT)
Age: 26
End: 2021-08-10

## 2021-08-11 ENCOUNTER — TRANSCRIPTION ENCOUNTER (OUTPATIENT)
Age: 26
End: 2021-08-11

## 2021-08-12 ENCOUNTER — APPOINTMENT (OUTPATIENT)
Dept: OBGYN | Facility: CLINIC | Age: 26
End: 2021-08-12

## 2021-08-24 ENCOUNTER — APPOINTMENT (OUTPATIENT)
Dept: NEUROLOGY | Facility: CLINIC | Age: 26
End: 2021-08-24

## 2021-08-25 ENCOUNTER — TRANSCRIPTION ENCOUNTER (OUTPATIENT)
Age: 26
End: 2021-08-25

## 2021-08-27 ENCOUNTER — TRANSCRIPTION ENCOUNTER (OUTPATIENT)
Age: 26
End: 2021-08-27

## 2021-08-30 ENCOUNTER — NON-APPOINTMENT (OUTPATIENT)
Age: 26
End: 2021-08-30

## 2021-08-30 ENCOUNTER — APPOINTMENT (OUTPATIENT)
Dept: NEUROLOGY | Facility: CLINIC | Age: 26
End: 2021-08-30
Payer: MEDICAID

## 2021-08-30 VITALS
WEIGHT: 111 LBS | DIASTOLIC BLOOD PRESSURE: 64 MMHG | HEART RATE: 62 BPM | BODY MASS INDEX: 16.82 KG/M2 | HEIGHT: 68 IN | SYSTOLIC BLOOD PRESSURE: 107 MMHG

## 2021-08-30 PROCEDURE — 99213 OFFICE O/P EST LOW 20 MIN: CPT

## 2021-09-01 ENCOUNTER — TRANSCRIPTION ENCOUNTER (OUTPATIENT)
Age: 26
End: 2021-09-01

## 2021-09-03 NOTE — PHYSICAL EXAM
[General Appearance - Alert] : alert [General Appearance - In No Acute Distress] : in no acute distress [Oriented To Time, Place, And Person] : oriented to person, place, and time [Impaired Insight] : insight and judgment were intact [Affect] : the affect was normal [Person] : oriented to person [Place] : oriented to place [Time] : oriented to time [Short Term Intact] : short term memory intact [Remote Intact] : remote memory intact [Registration Intact] : recent registration memory intact [Span Intact] : the attention span was normal [Concentration Intact] : normal concentrating ability [Visual Intact] : visual attention was ~T not ~L decreased [Naming Objects] : no difficulty naming common objects [Repeating Phrases] : no difficulty repeating a phrase [Writing A Sentence] : no difficulty writing a sentence [Fluency] : fluency intact [Comprehension] : comprehension intact [Reading] : reading intact [Current Events] : adequate knowledge of current events [Past History] : adequate knowledge of personal past history [Vocabulary] : adequate range of vocabulary [Cranial Nerves Optic (II)] : visual acuity intact bilaterally,  visual fields full to confrontation, pupils equal round and reactive to light [Cranial Nerves Oculomotor (III)] : extraocular motion intact [Cranial Nerves Trigeminal (V)] : facial sensation intact symmetrically [Cranial Nerves Facial (VII)] : face symmetrical [Cranial Nerves Vestibulocochlear (VIII)] : hearing was intact bilaterally [Cranial Nerves Glossopharyngeal (IX)] : tongue and palate midline [Cranial Nerves Accessory (XI - Cranial And Spinal)] : head turning and shoulder shrug symmetric [Cranial Nerves Hypoglossal (XII)] : there was no tongue deviation with protrusion [Motor Tone] : muscle tone was normal in all four extremities [Motor Strength] : muscle strength was normal in all four extremities [No Muscle Atrophy] : normal bulk in all four extremities [Sensation Tactile Decrease] : light touch was intact [Sensation Pain / Temperature Decrease] : pain and temperature was intact [Sensation Vibration Decrease] : vibration was intact [Proprioception] : proprioception was intact [Abnormal Walk] : normal gait [Balance] : balance was intact [2+] : Ankle jerk left 2+ [Sclera] : the sclera and conjunctiva were normal [PERRL With Normal Accommodation] : pupils were equal in size, round, reactive to light, with normal accommodation [Extraocular Movements] : extraocular movements were intact [Outer Ear] : the ears and nose were normal in appearance [Oropharynx] : the oropharynx was normal [Past-pointing] : there was no past-pointing [Tremor] : no tremor present [Plantar Reflex Right Only] : normal on the right [Plantar Reflex Left Only] : normal on the left

## 2021-09-03 NOTE — HISTORY OF PRESENT ILLNESS
[Febrile Seizures] : febrile seizures [FreeTextEntry1] : 8/30/2021\par Ms Rcoio Sainz is doing well with no recent documented seizures. Her last seizure was in the setting of Xanax withdrawal in Feb 2015\par \par Levetiracetam 500mg BID\par \par Hx\par The patient is 26year-old right handed woman with seizures starting at age 1, with generalized tonic clonic seizures.  She was placed on medication at age 1, and at some point was diagnosed with petit mal around age 3, with abnormal EEG.  She started on Zarontin but was allergic.  She was tried on Dilantin, phenobarbital, neurontin, Topamax and none worked well and there were behavioral side effects.\par \par She was stable on lamictal by age 16 to the point where it was tapered last year at age 18 since she was doing well.  No EEGs were performed.  She had an MRI at [facility] but magdaleno not have result, from 2014.\par \par She had one GTC at a club with strobe lights while on Lamictal in 2012.  She feels jumpy when she looks at strobe lights.  \par \par It is unclear if she is continuing to have petit mal spells.  Her most recent convulsion was  in the setting of Xanax withdrawal and it was severe with three repetitive seizures..  In November 2014, while using drugs, but not withdrawing, she had a witnessed seizure at work and she had soft tissue injuries.   \par Last seizure in Feb 2015 \par \par She feels irritable on Keppra after this was started in November 2014, and would like to consider going back to Lamictal since she states her mood was better.\par \par She was born with umbilical cord around her neck, and she had some special education classes.  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

## 2021-09-03 NOTE — DISCUSSION/SUMMARY
[Medically Refractory (seizure within the last year)] : Medically Refractory (seizure within the last year) [Risks Associated with Driving/[place] Law] : As per my usual protocol, the patient was advised in regards to risks and driving privileges associated with the [place] Guidelines.  [Safety Recommendations] : The patient was advised in regards to the risk of seizures and general seizure safety recommendations including not to be bathing alone, climbing to high places and operating heavy machinery. [Compliance with Medications] : The importance of compliance with medications was reinforced. [Medication Side Effects] : High frequency and serious potential medication adverse effects were reviewed with the patient, including but not exclusive to psychiatric effects.  Information sheets on medication side effects were made available to the patient in our clinic.  The patient or advocate agrees to notify us for any concerns. [Teratogenicity] : Risks associated with AED use in pregnancy, teratogenicity and methods of contraception were discussed.  [FreeTextEntry1] : Patient with history of seizures as  a child and recent GTCs one at least in setting of benzo withdrawal. \par last event Feb 2015 \par mild mood effects from Keppra according to mother\par difficulty staying asleep (some anxiety)\par currently in substance abuse program (Anorexia in last year of highs school) related to break up with patient)\par sexually active (on OCP) does not use baririer (counselled on safe sex)\par \par Plan:\par continue Keppra 500mg BID and Escitalopram 20 mg daily\par counselled on ETOH/ substance abuse\par -On OCP \par - relaxation techniques discussed to help with anxiety/stress\par - annual labs\par - follow up in 4-6  months\par \par

## 2021-09-07 ENCOUNTER — TRANSCRIPTION ENCOUNTER (OUTPATIENT)
Age: 26
End: 2021-09-07

## 2021-09-15 ENCOUNTER — TRANSCRIPTION ENCOUNTER (OUTPATIENT)
Age: 26
End: 2021-09-15

## 2021-10-06 ENCOUNTER — RX RENEWAL (OUTPATIENT)
Age: 26
End: 2021-10-06

## 2021-10-07 ENCOUNTER — APPOINTMENT (OUTPATIENT)
Dept: INTERNAL MEDICINE | Facility: CLINIC | Age: 26
End: 2021-10-07
Payer: MEDICAID

## 2021-10-07 ENCOUNTER — TRANSCRIPTION ENCOUNTER (OUTPATIENT)
Age: 26
End: 2021-10-07

## 2021-10-07 VITALS
HEART RATE: 87 BPM | TEMPERATURE: 97.4 F | BODY MASS INDEX: 16.82 KG/M2 | OXYGEN SATURATION: 97 % | SYSTOLIC BLOOD PRESSURE: 116 MMHG | WEIGHT: 111 LBS | HEIGHT: 68 IN | RESPIRATION RATE: 14 BRPM | DIASTOLIC BLOOD PRESSURE: 70 MMHG

## 2021-10-07 DIAGNOSIS — Z02.89 ENCOUNTER FOR OTHER ADMINISTRATIVE EXAMINATIONS: ICD-10-CM

## 2021-10-07 DIAGNOSIS — Z23 ENCOUNTER FOR IMMUNIZATION: ICD-10-CM

## 2021-10-07 PROCEDURE — 99213 OFFICE O/P EST LOW 20 MIN: CPT

## 2021-10-07 NOTE — PHYSICAL EXAM
[No Acute Distress] : no acute distress [Well-Appearing] : well-appearing [Normal Voice/Communication] : normal voice/communication [Normal Oropharynx] : the oropharynx was normal [No Lymphadenopathy] : no lymphadenopathy [No Respiratory Distress] : no respiratory distress  [No Accessory Muscle Use] : no accessory muscle use [Clear to Auscultation] : lungs were clear to auscultation bilaterally [Normal Rate] : normal rate  [Regular Rhythm] : with a regular rhythm [No Murmur] : no murmur heard [No Focal Deficits] : no focal deficits [Alert and Oriented x3] : oriented to person, place, and time

## 2021-10-09 ENCOUNTER — NON-APPOINTMENT (OUTPATIENT)
Age: 26
End: 2021-10-09

## 2021-10-09 LAB
HBV SURFACE AB SER QL: ABNORMAL
MEV IGG FLD QL IA: 96.3 AU/ML
MEV IGG+IGM SER-IMP: POSITIVE
MUV AB SER-ACNC: NEGATIVE
MUV IGG SER QL IA: 5.4 AU/ML
RUBV IGG FLD-ACNC: 1.6 INDEX
RUBV IGG SER-IMP: POSITIVE
VZV AB TITR SER: POSITIVE
VZV IGG SER IF-ACNC: 894.9 INDEX

## 2021-10-18 ENCOUNTER — APPOINTMENT (OUTPATIENT)
Dept: INTERNAL MEDICINE | Facility: CLINIC | Age: 26
End: 2021-10-18

## 2021-10-29 ENCOUNTER — TRANSCRIPTION ENCOUNTER (OUTPATIENT)
Age: 26
End: 2021-10-29

## 2021-11-02 ENCOUNTER — TRANSCRIPTION ENCOUNTER (OUTPATIENT)
Age: 26
End: 2021-11-02

## 2021-11-05 ENCOUNTER — NON-APPOINTMENT (OUTPATIENT)
Age: 26
End: 2021-11-05

## 2021-11-10 ENCOUNTER — APPOINTMENT (OUTPATIENT)
Dept: OBGYN | Facility: CLINIC | Age: 26
End: 2021-11-10

## 2021-12-01 ENCOUNTER — TRANSCRIPTION ENCOUNTER (OUTPATIENT)
Age: 26
End: 2021-12-01

## 2021-12-16 ENCOUNTER — APPOINTMENT (OUTPATIENT)
Dept: OBGYN | Facility: CLINIC | Age: 26
End: 2021-12-16

## 2021-12-23 ENCOUNTER — TRANSCRIPTION ENCOUNTER (OUTPATIENT)
Age: 26
End: 2021-12-23

## 2021-12-28 ENCOUNTER — TRANSCRIPTION ENCOUNTER (OUTPATIENT)
Age: 26
End: 2021-12-28

## 2021-12-29 ENCOUNTER — TRANSCRIPTION ENCOUNTER (OUTPATIENT)
Age: 26
End: 2021-12-29

## 2022-01-04 ENCOUNTER — TRANSCRIPTION ENCOUNTER (OUTPATIENT)
Age: 27
End: 2022-01-04

## 2022-01-05 ENCOUNTER — TRANSCRIPTION ENCOUNTER (OUTPATIENT)
Age: 27
End: 2022-01-05

## 2022-01-10 ENCOUNTER — TRANSCRIPTION ENCOUNTER (OUTPATIENT)
Age: 27
End: 2022-01-10

## 2022-01-11 ENCOUNTER — TRANSCRIPTION ENCOUNTER (OUTPATIENT)
Age: 27
End: 2022-01-11

## 2022-01-24 ENCOUNTER — TRANSCRIPTION ENCOUNTER (OUTPATIENT)
Age: 27
End: 2022-01-24

## 2022-01-25 ENCOUNTER — TRANSCRIPTION ENCOUNTER (OUTPATIENT)
Age: 27
End: 2022-01-25

## 2022-01-26 ENCOUNTER — APPOINTMENT (OUTPATIENT)
Dept: NEUROLOGY | Facility: CLINIC | Age: 27
End: 2022-01-26
Payer: MEDICAID

## 2022-01-26 PROCEDURE — 99213 OFFICE O/P EST LOW 20 MIN: CPT | Mod: 95

## 2022-01-26 NOTE — HISTORY OF PRESENT ILLNESS
[Febrile Seizures] : febrile seizures [FreeTextEntry1] : ***UPDATE: 1/26/2022***\par Appointment was conducted by audiovisual/telehealth at request of patient in place of a follow up office visit due to heightened concern for Corona virus infection risk.\par Verbal consent given on Jan 26, 2022 2:26 PM by the patient Ms. CATRACHITO SAINZ Jul 23 1995 who understands that the telephone visit will be charged to insurance and may involve co-pay for patient\par Nurse Practitioner location:office\par Patient location:home\par Individuals on call: DARIUS Pedro, Ms. CATRACHITO SAINZ\par \par Ms Catrachito Sainz remains seizure free and doing well\par She is starting a 2 year [employer] program at [employer]\par Requesting DMV forms to be completed\par \par She is now off Escitalopram since last weekend and started on Symbyax. She is followed by a psychiatrist and therapist and doing well\par \par \par \par 8/30/2021\par Ms Catrachito Sainz is doing well with no recent documented seizures. Her last seizure was in the setting of Xanax withdrawal in Feb 2015\par \par Levetiracetam 500mg BID\par \par Hx\par The patient is 26year-old right handed woman with seizures starting at age 1, with generalized tonic clonic seizures.  She was placed on medication at age 1, and at some point was diagnosed with petit mal around age 3, with abnormal EEG.  She started on Zarontin but was allergic.  She was tried on Dilantin, phenobarbital, neurontin, Topamax and none worked well and there were behavioral side effects.\par \par She was stable on lamictal by age 16 to the point where it was tapered last year at age 18 since she was doing well.  No EEGs were performed.  She had an MRI at [facility] but magdaleno not have result, from 2014.\par \par She had one GTC at a club with strobe lights while on Lamictal in 2012.  She feels jumpy when she looks at strobe lights.  \par \par It is unclear if she is continuing to have petit mal spells.  Her most recent convulsion was  in the setting of Xanax withdrawal and it was severe with three repetitive seizures..  In November 2014, while using drugs, but not withdrawing, she had a witnessed seizure at work and she had soft tissue injuries.   \par Last seizure in Feb 2015 \par \par She feels irritable on Keppra after this was started in November 2014, and would like to consider going back to Lamictal since she states her mood was better.\par \par She was born with umbilical cord around her neck, and she had some special education classes.  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

## 2022-01-31 ENCOUNTER — TRANSCRIPTION ENCOUNTER (OUTPATIENT)
Age: 27
End: 2022-01-31

## 2022-02-02 ENCOUNTER — TRANSCRIPTION ENCOUNTER (OUTPATIENT)
Age: 27
End: 2022-02-02

## 2022-02-04 ENCOUNTER — TRANSCRIPTION ENCOUNTER (OUTPATIENT)
Age: 27
End: 2022-02-04

## 2022-02-08 ENCOUNTER — TRANSCRIPTION ENCOUNTER (OUTPATIENT)
Age: 27
End: 2022-02-08

## 2022-02-10 ENCOUNTER — TRANSCRIPTION ENCOUNTER (OUTPATIENT)
Age: 27
End: 2022-02-10

## 2022-02-17 ENCOUNTER — TRANSCRIPTION ENCOUNTER (OUTPATIENT)
Age: 27
End: 2022-02-17

## 2022-02-18 ENCOUNTER — TRANSCRIPTION ENCOUNTER (OUTPATIENT)
Age: 27
End: 2022-02-18

## 2022-02-24 ENCOUNTER — APPOINTMENT (OUTPATIENT)
Dept: OBGYN | Facility: CLINIC | Age: 27
End: 2022-02-24
Payer: MEDICAID

## 2022-02-24 VITALS
SYSTOLIC BLOOD PRESSURE: 133 MMHG | BODY MASS INDEX: 18.19 KG/M2 | HEIGHT: 68 IN | WEIGHT: 120 LBS | DIASTOLIC BLOOD PRESSURE: 78 MMHG

## 2022-02-24 DIAGNOSIS — Z01.419 ENCOUNTER FOR GYNECOLOGICAL EXAMINATION (GENERAL) (ROUTINE) W/OUT ABNORMAL FINDINGS: ICD-10-CM

## 2022-02-24 DIAGNOSIS — Z31.69 ENCOUNTER FOR OTHER GENERAL COUNSELING AND ADVICE ON PROCREATION: ICD-10-CM

## 2022-02-24 PROCEDURE — 99212 OFFICE O/P EST SF 10 MIN: CPT | Mod: 25

## 2022-02-24 PROCEDURE — 99395 PREV VISIT EST AGE 18-39: CPT

## 2022-02-24 NOTE — HISTORY OF PRESENT ILLNESS
[FreeTextEntry1] : 25yo [id number] LMP 2/5/22 presents for annual GYN visit\par Patient denies any GYN complaints \par Stopped OCPs 3 months periods now heavier but overall moderate\par TTC for the last 1.5 moths\par \par Menstrual Cycle:monthly\par Sexual Activity:one male partner\par Contraception:none\par \par \par ROS: Denies fever/chills, HA, Cough/sore throat, CP, SOB, N/V, Diarrhea/Constipation, Pelvic pain, Urinary frequency/ urgency/ Incontinence, irregular vaginal bleeding, discharge or irritation\par \par New Medica Dx.or Sx since last visit:none\par PMH: neurology epilepsy on Kepra\par meds: kepra, olanzapine, and Aderall- working with neuro to decreased \par \par Preventative\par PCP:yearly\par Physical Activity:yes\par Diet:healthy\par \par

## 2022-02-25 LAB — HPV HIGH+LOW RISK DNA PNL CVX: NOT DETECTED

## 2022-03-04 ENCOUNTER — TRANSCRIPTION ENCOUNTER (OUTPATIENT)
Age: 27
End: 2022-03-04

## 2022-03-04 LAB — CYTOLOGY CVX/VAG DOC THIN PREP: ABNORMAL

## 2022-03-07 ENCOUNTER — TRANSCRIPTION ENCOUNTER (OUTPATIENT)
Age: 27
End: 2022-03-07

## 2022-03-08 ENCOUNTER — TRANSCRIPTION ENCOUNTER (OUTPATIENT)
Age: 27
End: 2022-03-08

## 2022-03-11 ENCOUNTER — APPOINTMENT (OUTPATIENT)
Dept: OBGYN | Facility: CLINIC | Age: 27
End: 2022-03-11
Payer: MEDICAID

## 2022-03-11 VITALS
SYSTOLIC BLOOD PRESSURE: 106 MMHG | BODY MASS INDEX: 18.19 KG/M2 | WEIGHT: 120 LBS | HEIGHT: 68 IN | DIASTOLIC BLOOD PRESSURE: 64 MMHG

## 2022-03-11 PROCEDURE — 99213 OFFICE O/P EST LOW 20 MIN: CPT

## 2022-03-11 NOTE — HISTORY OF PRESENT ILLNESS
[FreeTextEntry1] : 25yo female LMP 2/1/22 presents for pregnancy 5 wk 3days by LMP\par Patient reports positive pregnancy test at home\par States she is now only on olanzepine and has stopped smoking\par Is taking PNVs\par \par ROS: Denies fever/chills, HA, Cough/sore throat, CP, SOB, N/V, Diarrhea/Constipation, Pelvic pain, Urinary frequency/ urgency/ Incontinence, irregular discharge or vaginal bleeding\par \par \par Plan\par \par \par

## 2022-03-11 NOTE — PLAN
[FreeTextEntry1] : early pregnancy: G/C done, cont. PNVs and counseled on BRAT diet\par \par \par GYN counseling: Patient instructed to call for Unusual Pelvic pain,  UTI symptoms, irregular vaginal bleeding/discharge- Patient verbalized agreement\par \par F/U: patient to follow up in 2 weeks for Confirmation visit

## 2022-03-25 ENCOUNTER — APPOINTMENT (OUTPATIENT)
Dept: OBGYN | Facility: CLINIC | Age: 27
End: 2022-03-25
Payer: MEDICAID

## 2022-03-25 ENCOUNTER — ASOB RESULT (OUTPATIENT)
Age: 27
End: 2022-03-25

## 2022-03-25 PROCEDURE — 99213 OFFICE O/P EST LOW 20 MIN: CPT

## 2022-03-25 PROCEDURE — 76817 TRANSVAGINAL US OBSTETRIC: CPT

## 2022-03-25 NOTE — HISTORY OF PRESENT ILLNESS
[FreeTextEntry1] : __yo G_P_  LMP  presents for confirmation of pregnancy\par * desired Pregnancy\par Reports Periods as regular and sure of LMP\par Patient reports daily morning  nausea managed by diet changed and preggo pops. Denies vaginal bleeding and cramping\par \par Sono: +FH\par \par ROS: Denies fever/chills, HA, Cough/sore throat, CP, SOB, N/V, Diarrhea/Constipation, Pelvic pain, Urinary frequency/ urgency/ Incontinence, irregular vaginal bleeding, discharge or irritation\par \par OBhx:\par GYNhx: isolated HPV, recently all wnl\par PMH:Epilepsy- Kepra 500 BID\par PSH: none\par Meds: Kepra, Olanzapine, pnvs\par Famhx: none\par \par

## 2022-03-25 NOTE — PLAN
[FreeTextEntry1] : Confirmation of pregnancy: TVUS done  continue PNVs\par \par - h/o epilepsy: 4mg Folic Acid, Recomend seeing Neurologist every trimester to consider Keppra adjustment. Patient desires primary C/S as she feels that she would have a seizure if she pushes. Discussed C/S vs  and the importance of weighing the risks. Will consider Neuro and MFM recs throughout the pregnancy patient verbalized understanding\par \par -Patient counseled on the dynamics of prenatal care in our office and delivery at [facility]\par \par - Early Pregnancy counseling and Miscarraige precautions given\par \par GYN counseling: Patient instructed to call for Unusual Pelvic pain,  UTI symptoms, irregular vaginal bleeding/discharge- Patient verbalized agreement\par \par F/U: patient to follow up in 4 weeks for NT and 1st prenatal visit

## 2022-03-28 ENCOUNTER — TRANSCRIPTION ENCOUNTER (OUTPATIENT)
Age: 27
End: 2022-03-28

## 2022-03-29 ENCOUNTER — TRANSCRIPTION ENCOUNTER (OUTPATIENT)
Age: 27
End: 2022-03-29

## 2022-03-30 ENCOUNTER — TRANSCRIPTION ENCOUNTER (OUTPATIENT)
Age: 27
End: 2022-03-30

## 2022-03-31 ENCOUNTER — APPOINTMENT (OUTPATIENT)
Dept: NEUROLOGY | Facility: CLINIC | Age: 27
End: 2022-03-31

## 2022-04-01 ENCOUNTER — APPOINTMENT (OUTPATIENT)
Dept: NEUROLOGY | Facility: CLINIC | Age: 27
End: 2022-04-01
Payer: MEDICAID

## 2022-04-01 ENCOUNTER — APPOINTMENT (OUTPATIENT)
Dept: INTERNAL MEDICINE | Facility: CLINIC | Age: 27
End: 2022-04-01
Payer: MEDICAID

## 2022-04-01 VITALS
TEMPERATURE: 98.1 F | RESPIRATION RATE: 14 BRPM | WEIGHT: 120 LBS | BODY MASS INDEX: 18.19 KG/M2 | DIASTOLIC BLOOD PRESSURE: 66 MMHG | OXYGEN SATURATION: 98 % | HEIGHT: 68 IN | SYSTOLIC BLOOD PRESSURE: 108 MMHG | HEART RATE: 77 BPM

## 2022-04-01 DIAGNOSIS — G40.909 EPILEPSY, UNSPECIFIED, NOT INTRACTABLE, W/OUT STATUS EPILEPTICUS: ICD-10-CM

## 2022-04-01 PROCEDURE — 99395 PREV VISIT EST AGE 18-39: CPT

## 2022-04-01 PROCEDURE — 99212 OFFICE O/P EST SF 10 MIN: CPT | Mod: 95

## 2022-04-01 RX ORDER — DROSPIRENONE AND ETHINYL ESTRADIOL 0.03MG-3MG
3-0.03 KIT ORAL
Qty: 28 | Refills: 0 | Status: DISCONTINUED | COMMUNITY
Start: 2021-09-15 | End: 2022-04-01

## 2022-04-01 RX ORDER — METRONIDAZOLE 500 MG/1
500 TABLET ORAL TWICE DAILY
Qty: 14 | Refills: 0 | Status: DISCONTINUED | COMMUNITY
Start: 2020-10-02 | End: 2022-04-01

## 2022-04-01 RX ORDER — NORETHINDRONE AND ETHINYL ESTRADIOL 1 MG-35MCG
1-35 KIT ORAL DAILY
Qty: 90 | Refills: 3 | Status: DISCONTINUED | COMMUNITY
Start: 2021-11-04 | End: 2022-04-01

## 2022-04-01 RX ORDER — SULFAMETHOXAZOLE AND TRIMETHOPRIM 800; 160 MG/1; MG/1
800-160 TABLET ORAL TWICE DAILY
Qty: 14 | Refills: 0 | Status: DISCONTINUED | COMMUNITY
Start: 2017-12-04 | End: 2022-04-01

## 2022-04-01 RX ORDER — DROSPIRENONE AND ETHINYL ESTRADIOL 0.03MG-3MG
3-0.03 KIT ORAL
Qty: 1 | Refills: 0 | Status: DISCONTINUED | COMMUNITY
Start: 2019-07-11 | End: 2022-04-01

## 2022-04-01 RX ORDER — FLUCONAZOLE 150 MG/1
150 TABLET ORAL
Qty: 2 | Refills: 0 | Status: DISCONTINUED | COMMUNITY
Start: 2020-10-02 | End: 2022-04-01

## 2022-04-01 RX ORDER — OLANZAPINE 7.5 MG/1
7.5 TABLET, FILM COATED ORAL DAILY
Refills: 0 | Status: ACTIVE | COMMUNITY
Start: 2022-04-01

## 2022-04-01 RX ORDER — PANTOPRAZOLE 40 MG/1
40 TABLET, DELAYED RELEASE ORAL
Qty: 30 | Refills: 2 | Status: DISCONTINUED | COMMUNITY
Start: 2020-05-11 | End: 2022-04-01

## 2022-04-01 RX ORDER — DROSPIRENONE AND ETHINYL ESTRADIOL 0.03MG-3MG
3-0.03 KIT ORAL
Qty: 1 | Refills: 0 | Status: DISCONTINUED | COMMUNITY
Start: 2020-06-01 | End: 2022-04-01

## 2022-04-01 NOTE — HISTORY OF PRESENT ILLNESS
[de-identified] : Pt is 8 weeks pregnant. Pt lives with her boyfriend. \par Having some nausea for a couple weeks. \par Works for [employer]. Going back to school.

## 2022-04-01 NOTE — HEALTH RISK ASSESSMENT
[Never] : Never [No] : In the past 12 months have you used drugs other than those required for medical reasons? No [No falls in past year] : Patient reported no falls in the past year [0] : 2) Feeling down, depressed, or hopeless: Not at all (0) [PHQ-2 Negative - No further assessment needed] : PHQ-2 Negative - No further assessment needed [Single] : single [Fully functional (bathing, dressing, toileting, transferring, walking, feeding)] : Fully functional (bathing, dressing, toileting, transferring, walking, feeding) [Fully functional (using the telephone, shopping, preparing meals, housekeeping, doing laundry, using] : Fully functional and needs no help or supervision to perform IADLs (using the telephone, shopping, preparing meals, housekeeping, doing laundry, using transportation, managing medications and managing finances) [[id number]] : 0

## 2022-04-02 LAB
25(OH)D3 SERPL-MCNC: 32.8 NG/ML
ALBUMIN SERPL ELPH-MCNC: 4.6 G/DL
ALP BLD-CCNC: 53 U/L
ALT SERPL-CCNC: 11 U/L
ANION GAP SERPL CALC-SCNC: 13 MMOL/L
AST SERPL-CCNC: 12 U/L
BASOPHILS # BLD AUTO: 0.03 K/UL
BASOPHILS NFR BLD AUTO: 0.4 %
BILIRUB SERPL-MCNC: 0.2 MG/DL
BUN SERPL-MCNC: 11 MG/DL
CALCIUM SERPL-MCNC: 9.9 MG/DL
CHLORIDE SERPL-SCNC: 104 MMOL/L
CHOLEST SERPL-MCNC: 155 MG/DL
CO2 SERPL-SCNC: 25 MMOL/L
CREAT SERPL-MCNC: 0.49 MG/DL
EGFR: 132 ML/MIN/1.73M2
EOSINOPHIL # BLD AUTO: 0.16 K/UL
EOSINOPHIL NFR BLD AUTO: 2.3 %
ESTIMATED AVERAGE GLUCOSE: 97 MG/DL
FOLATE SERPL-MCNC: >20 NG/ML
GLUCOSE SERPL-MCNC: 94 MG/DL
HBA1C MFR BLD HPLC: 5 %
HCT VFR BLD CALC: 35.8 %
HDLC SERPL-MCNC: 67 MG/DL
HGB BLD-MCNC: 12.2 G/DL
IMM GRANULOCYTES NFR BLD AUTO: 0.3 %
LDLC SERPL CALC-MCNC: 75 MG/DL
LYMPHOCYTES # BLD AUTO: 1.99 K/UL
LYMPHOCYTES NFR BLD AUTO: 28.9 %
MAN DIFF?: NORMAL
MCHC RBC-ENTMCNC: 30.2 PG
MCHC RBC-ENTMCNC: 34.1 GM/DL
MCV RBC AUTO: 88.6 FL
MONOCYTES # BLD AUTO: 0.45 K/UL
MONOCYTES NFR BLD AUTO: 6.5 %
NEUTROPHILS # BLD AUTO: 4.23 K/UL
NEUTROPHILS NFR BLD AUTO: 61.6 %
NONHDLC SERPL-MCNC: 88 MG/DL
PLATELET # BLD AUTO: 195 K/UL
POTASSIUM SERPL-SCNC: 5 MMOL/L
PROT SERPL-MCNC: 7.1 G/DL
RBC # BLD: 4.04 M/UL
RBC # FLD: 11.7 %
SODIUM SERPL-SCNC: 142 MMOL/L
TRIGL SERPL-MCNC: 66 MG/DL
TSH SERPL-ACNC: 0.69 UIU/ML
VIT B12 SERPL-MCNC: 582 PG/ML
WBC # FLD AUTO: 6.88 K/UL

## 2022-04-04 NOTE — DISCUSSION/SUMMARY
[Medically Refractory (seizure within the last year)] : Medically Refractory (seizure within the last year) [[place]isks Associated with D[place] Law] : As per my usual protocol, the patient was advised in regards to risks and driving privileges associated with the [place] Guidelines.  [Safety Recommendations] : The patient was advised in regards to the risk of seizures and general seizure safety recommendations including not to be bathing alone, climbing to high places and operating heavy machinery. [Compliance with Medications] : The importance of compliance with medications was reinforced. [Medication Side Effects] : High frequency and serious potential medication adverse effects were reviewed with the patient, including but not exclusive to psychiatric effects.  Information sheets on medication side effects were made available to the patient in our clinic.  The patient or advocate agrees to notify us for any concerns. [Teratogenicity] : Risks associated with AED use in pregnancy, teratogenicity and methods of contraception were discussed.  [FreeTextEntry1] : Patient with history of seizures as  a child and recent GTCs one at least in setting of benzo withdrawal. \par last event Feb 2015 \par mild mood effects from Keppra according to mother\par difficulty staying asleep (some anxiety)\par currently in substance abuse program (Anorexia in last year of highs school) related to break up with patient)\par \par \par Plan:\par continue Keppra 500mg BID \par reviewed seizure triggers counselled on ETOH/ substance abuse\par annual labs including AED level\par follow up in 4- 6 weeks\par -\par

## 2022-04-04 NOTE — HISTORY OF PRESENT ILLNESS
[Febrile Seizures] : febrile seizures [FreeTextEntry1] : ***UPDATE:4/1/2022***\par Appointment was conducted by audiovisual/telehealth at request of patient in place of a follow up office visit due to heightened concern for Corona virus infection risk.\par Verbal consent given on Apr 04, 2022 6:47 AM by the patient Ms. CATRACHITO SAINZ Jul 23 1995 who understands that the telephone visit will be charged to insurance and may involve co-pay for patient\par Nurse Practitioner location:office\par Patient location:home\par Individuals on call: DARIUS Pedro, Ms. CATRACHITO SAINZ\par \par Ms Catrachito Sainz Is doing well with no interval seizures. She is currently 8 weeks pregnant.\par \par \par Levetiracetam 500 mg BID\par \par ***UPDATE: 1/26/2022***\par Appointment was conducted by audiovisual/telehealth at request of patient in place of a follow up office visit due to heightened concern for Corona virus infection risk.\par Verbal consent given on Jan 26, 2022 2:26 PM by the patient Ms. CATRACHITO SAINZ Jul 23 1995 who understands that the telephone visit will be charged to insurance and may involve co-pay for patient\par Nurse Practitioner location:office\par Patient location:home\par Individuals on call: DARIUS Pedro, Ms. 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 She is followed by a psychiatrist and therapist and doing well\par \par \par \par 8/30/2021\par Ms Catrachito Sainz is doing well with no recent documented seizures. Her last seizure was in the setting of Xanax withdrawal in Feb 2015\par \par Levetiracetam 500mg BID\par \par Hx\par The patient is 26year-old right handed woman with seizures starting at age 1, with generalized tonic clonic seizures.  She was placed on medication at age 1, and at some point was diagnosed with petit mal around age 3, with abnormal EEG.  She started on Zarontin but was allergic.  She was tried on Dilantin, phenobarbital, neurontin, Topamax and none worked well and there were behavioral side effects.\par \par She was stable on lamictal by age 16 to the point where it was tapered last year at age 18 since she was doing well.  No EEGs were performed.  She had an MRI at [facility] but magdaleno not have result, from 2014.\par \par She had one GTC at a club with strobe lights while on Lamictal in 2012.  She feels jumpy when she looks at strobe lights.  \par \par It is unclear if she is continuing to have petit mal spells.  Her most recent convulsion was  in the setting of Xanax withdrawal and it was severe with three repetitive seizures..  In November 2014, while using drugs, but not withdrawing, she had a witnessed seizure at work and she had soft tissue injuries.   \par Last seizure in Feb 2015 \par \par She feels irritable on Keppra after this was started in November 2014, and would like to consider going back to Lamictal since she states her mood was better.\par \par She was born with umbilical cord around her neck, and she had some special education classes.  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

## 2022-04-04 NOTE — PHYSICAL EXAM
[General Appearance - Alert] : alert [General Appearance - In No Acute Distress] : in no acute distress [Oriented To Time, Place, And Person] : oriented to person, place, and time [Impaired Insight] : insight and judgment were intact [Affect] : the affect was normal [Person] : oriented to person [Place] : oriented to place [Time] : oriented to time [Short Term Intact] : short term memory intact [Remote Intact] : remote memory intact [Registration Intact] : recent registration memory intact [Span Intact] : the attention span was normal [Concentration Intact] : normal concentrating ability [Visual Intact] : visual attention was ~T not ~L decreased [Naming Objects] : no difficulty naming common objects [Repeating Phrases] : no difficulty repeating a phrase [Writing A Sentence] : no difficulty writing a sentence [Fluency] : fluency intact [Comprehension] : comprehension intact [Reading] : reading intact [Current Events] : adequate knowledge of current events [Past History] : adequate knowledge of personal past history [Vocabulary] : adequate range of vocabulary [Cranial Nerves Optic (II)] : visual acuity intact bilaterally,  visual fields full to confrontation, pupils equal round and reactive to light [Cranial Nerves Oculomotor (III)] : extraocular motion intact [Cranial Nerves Trigeminal (V)] : facial sensation intact symmetrically [Cranial Nerves Facial (VII)] : face symmetrical [Cranial Nerves Vestibulocochlear (VIII)] : hearing was intact bilaterally [Cranial Nerves Glossopharyngeal (IX)] : tongue and palate midline [Cranial Nerves Accessory (XI - Cranial And Spinal)] : head turning and shoulder shrug symmetric [Cranial Nerves Hypoglossal (XII)] : there was no tongue deviation with protrusion [Motor Tone] : muscle tone was normal in all four extremities [Motor Strength] : muscle strength was normal in all four extremities [No Muscle Atrophy] : normal bulk in all four extremities [Sensation Pain / Temperature Decrease] : pain and temperature was intact [Sensation Tactile Decrease] : light touch was intact [Sensation Vibration Decrease] : vibration was intact [Proprioception] : proprioception was intact [Abnormal Walk] : normal gait [Balance] : balance was intact [2+] : Ankle jerk left 2+ [PERRL With Normal Accommodation] : pupils were equal in size, round, reactive to light, with normal accommodation [Sclera] : the sclera and conjunctiva were normal [Extraocular Movements] : extraocular movements were intact [Outer Ear] : the ears and nose were normal in appearance [Oropharynx] : the oropharynx was normal [Past-pointing] : there was no past-pointing [Tremor] : no tremor present [Plantar Reflex Right Only] : normal on the right [Plantar Reflex Left Only] : normal on the left

## 2022-04-05 ENCOUNTER — TRANSCRIPTION ENCOUNTER (OUTPATIENT)
Age: 27
End: 2022-04-05

## 2022-04-06 ENCOUNTER — TRANSCRIPTION ENCOUNTER (OUTPATIENT)
Age: 27
End: 2022-04-06

## 2022-04-06 ENCOUNTER — NON-APPOINTMENT (OUTPATIENT)
Age: 27
End: 2022-04-06

## 2022-04-07 ENCOUNTER — TRANSCRIPTION ENCOUNTER (OUTPATIENT)
Age: 27
End: 2022-04-07

## 2022-04-18 LAB — LEVETIRACETAM SERPL-MCNC: 8.2 UG/ML

## 2022-04-21 ENCOUNTER — APPOINTMENT (OUTPATIENT)
Dept: ANTEPARTUM | Facility: CLINIC | Age: 27
End: 2022-04-21
Payer: MEDICAID

## 2022-04-21 ENCOUNTER — ASOB RESULT (OUTPATIENT)
Age: 27
End: 2022-04-21

## 2022-04-21 PROCEDURE — 76817 TRANSVAGINAL US OBSTETRIC: CPT

## 2022-04-21 PROCEDURE — 76801 OB US < 14 WKS SINGLE FETUS: CPT

## 2022-04-25 ENCOUNTER — NON-APPOINTMENT (OUTPATIENT)
Age: 27
End: 2022-04-25

## 2022-04-25 ENCOUNTER — TRANSCRIPTION ENCOUNTER (OUTPATIENT)
Age: 27
End: 2022-04-25

## 2022-04-26 ENCOUNTER — TRANSCRIPTION ENCOUNTER (OUTPATIENT)
Age: 27
End: 2022-04-26

## 2022-04-27 ENCOUNTER — APPOINTMENT (OUTPATIENT)
Dept: OBGYN | Facility: CLINIC | Age: 27
End: 2022-04-27

## 2022-04-28 ENCOUNTER — NON-APPOINTMENT (OUTPATIENT)
Age: 27
End: 2022-04-28

## 2022-04-28 ENCOUNTER — ASOB RESULT (OUTPATIENT)
Age: 27
End: 2022-04-28

## 2022-04-28 ENCOUNTER — LABORATORY RESULT (OUTPATIENT)
Age: 27
End: 2022-04-28

## 2022-04-28 ENCOUNTER — APPOINTMENT (OUTPATIENT)
Dept: ANTEPARTUM | Facility: CLINIC | Age: 27
End: 2022-04-28
Payer: MEDICAID

## 2022-04-28 PROCEDURE — 36416 COLLJ CAPILLARY BLOOD SPEC: CPT

## 2022-04-28 PROCEDURE — 76813 OB US NUCHAL MEAS 1 GEST: CPT

## 2022-04-28 PROCEDURE — 76801 OB US < 14 WKS SINGLE FETUS: CPT | Mod: 59

## 2022-04-29 ENCOUNTER — NON-APPOINTMENT (OUTPATIENT)
Age: 27
End: 2022-04-29

## 2022-04-29 ENCOUNTER — APPOINTMENT (OUTPATIENT)
Dept: OBGYN | Facility: CLINIC | Age: 27
End: 2022-04-29
Payer: MEDICAID

## 2022-04-29 VITALS
SYSTOLIC BLOOD PRESSURE: 112 MMHG | HEIGHT: 68 IN | WEIGHT: 136 LBS | BODY MASS INDEX: 20.61 KG/M2 | DIASTOLIC BLOOD PRESSURE: 66 MMHG

## 2022-04-29 PROCEDURE — 0500F INITIAL PRENATAL CARE VISIT: CPT

## 2022-05-03 ENCOUNTER — TRANSCRIPTION ENCOUNTER (OUTPATIENT)
Age: 27
End: 2022-05-03

## 2022-05-03 LAB
ABO + RH PNL BLD: NORMAL
ALBUMIN SERPL ELPH-MCNC: 4.2 G/DL
ALP BLD-CCNC: 45 U/L
ALT SERPL-CCNC: 12 U/L
ANION GAP SERPL CALC-SCNC: 11 MMOL/L
AST SERPL-CCNC: 13 U/L
B19V IGG SER QL IA: 0.54 INDEX
B19V IGG+IGM SER-IMP: NEGATIVE
B19V IGG+IGM SER-IMP: NORMAL
B19V IGM FLD-ACNC: 0.09 INDEX
B19V IGM SER-ACNC: NEGATIVE
BASOPHILS # BLD AUTO: 0.05 K/UL
BASOPHILS NFR BLD AUTO: 0.6 %
BILIRUB SERPL-MCNC: 0.2 MG/DL
BLD GP AB SCN SERPL QL: NORMAL
BUN SERPL-MCNC: 9 MG/DL
CALCIUM SERPL-MCNC: 8.8 MG/DL
CHLORIDE SERPL-SCNC: 102 MMOL/L
CMV IGG SERPL QL: <0.2 U/ML
CMV IGG SERPL-IMP: NEGATIVE
CMV IGM SERPL QL: <8 AU/ML
CMV IGM SERPL QL: NEGATIVE
CO2 SERPL-SCNC: 25 MMOL/L
CREAT SERPL-MCNC: 0.43 MG/DL
EGFR: 136 ML/MIN/1.73M2
EOSINOPHIL # BLD AUTO: 0.23 K/UL
EOSINOPHIL NFR BLD AUTO: 2.8 %
GLUCOSE SERPL-MCNC: 81 MG/DL
HBV SURFACE AG SER QL: NONREACTIVE
HCT VFR BLD CALC: 33.4 %
HCV AB SER QL: NONREACTIVE
HCV S/CO RATIO: 0.1 S/CO
HGB A MFR BLD: 97.4 %
HGB A2 MFR BLD: 2.6 %
HGB BLD-MCNC: 11.4 G/DL
HGB FRACT BLD-IMP: NORMAL
HIV1+2 AB SPEC QL IA.RAPID: NONREACTIVE
IMM GRANULOCYTES NFR BLD AUTO: 1 %
LEAD BLD-MCNC: <1 UG/DL
LYMPHOCYTES # BLD AUTO: 1.5 K/UL
LYMPHOCYTES NFR BLD AUTO: 18 %
MAN DIFF?: NORMAL
MCHC RBC-ENTMCNC: 31.8 PG
MCHC RBC-ENTMCNC: 34.1 GM/DL
MCV RBC AUTO: 93 FL
MONOCYTES # BLD AUTO: 0.74 K/UL
MONOCYTES NFR BLD AUTO: 8.9 %
NEUTROPHILS # BLD AUTO: 5.73 K/UL
NEUTROPHILS NFR BLD AUTO: 68.7 %
PLATELET # BLD AUTO: 180 K/UL
POTASSIUM SERPL-SCNC: 4.4 MMOL/L
PROT SERPL-MCNC: 6.2 G/DL
RBC # BLD: 3.59 M/UL
RBC # FLD: 12.6 %
RUBV IGG FLD-ACNC: 1.5 INDEX
RUBV IGG SER-IMP: POSITIVE
SODIUM SERPL-SCNC: 138 MMOL/L
T GONDII AB SER-IMP: NEGATIVE
T GONDII AB SER-IMP: NEGATIVE
T GONDII IGG SER QL: <3 IU/ML
T GONDII IGM SER QL: <3 AU/ML
T PALLIDUM AB SER QL IA: NEGATIVE
TSH SERPL-ACNC: 0.94 UIU/ML
VZV AB TITR SER: POSITIVE
VZV IGG SER IF-ACNC: 1081 INDEX
WBC # FLD AUTO: 8.33 K/UL

## 2022-05-04 ENCOUNTER — TRANSCRIPTION ENCOUNTER (OUTPATIENT)
Age: 27
End: 2022-05-04

## 2022-05-05 ENCOUNTER — NON-APPOINTMENT (OUTPATIENT)
Age: 27
End: 2022-05-05

## 2022-05-05 ENCOUNTER — APPOINTMENT (OUTPATIENT)
Dept: ANTEPARTUM | Facility: CLINIC | Age: 27
End: 2022-05-05

## 2022-05-06 LAB
AR GENE MUT ANL BLD/T: NORMAL
CFTR MUT TESTED BLD/T: NEGATIVE
FMR1 GENE MUT ANL BLD/T: NORMAL

## 2022-05-16 ENCOUNTER — TRANSCRIPTION ENCOUNTER (OUTPATIENT)
Age: 27
End: 2022-05-16

## 2022-05-24 ENCOUNTER — TRANSCRIPTION ENCOUNTER (OUTPATIENT)
Age: 27
End: 2022-05-24

## 2022-05-26 ENCOUNTER — APPOINTMENT (OUTPATIENT)
Dept: NEUROLOGY | Facility: CLINIC | Age: 27
End: 2022-05-26
Payer: MEDICAID

## 2022-05-26 PROCEDURE — 99202 OFFICE O/P NEW SF 15 MIN: CPT | Mod: 95

## 2022-05-26 PROCEDURE — 99212 OFFICE O/P EST SF 10 MIN: CPT | Mod: 95

## 2022-05-26 NOTE — DISCUSSION/SUMMARY
[Medically Refractory (seizure within the last year)] : Medically Refractory (seizure within the last year) [[place]isks [place] with D[place]] : As per my usual protocol, the patient was advised in regards to risks and driving privileges associated with the [place] Guidelines.  [Safety Recommendations] : The patient was advised in regards to the risk of seizures and general seizure safety recommendations including not to be bathing alone, climbing to high places and operating heavy machinery. [Compliance with Medications] : The importance of compliance with medications was reinforced. [Medication Side Effects] : High frequency and serious potential medication adverse effects were reviewed with the patient, including but not exclusive to psychiatric effects.  Information sheets on medication side effects were made available to the patient in our clinic.  The patient or advocate agrees to notify us for any concerns. [Teratogenicity] : Risks associated with AED use in pregnancy, teratogenicity and methods of contraception were discussed.  [FreeTextEntry1] : Patient with history of seizures as  a child and recent GTCs one at least in setting of benzo withdrawal. \par last event Feb 2015 \par mild mood effects from Keppra according to mother\par difficulty staying asleep (some anxiety)\par currently in substance abuse program (Anorexia in last year of highs school) related to break up with patient)\par \par \par Plan:\par continue Keppra 500mg  (1.5 tabs po BID) \par reviewed seizure triggers counselled on ETOH/ substance abuse\par awating Levetiracetam levels\par follow up in 4- 6 weeks\par -\par

## 2022-05-26 NOTE — HISTORY OF PRESENT ILLNESS
[Febrile Seizures] : febrile seizures [FreeTextEntry1] : ***UPDATE:5/26/2022***\par Appointment was conducted by audiovisual/telehealth at request of patient in place of a follow up office visit due to heightened concern for Corona virus infection risk.\par Verbal consent given on May 26, 2022 11:31 AM by the patient Ms. CATRACHITO COMER Jul 23 1995 who understands that the telephone visit will be charged to insurance and may involve co-pay for patient\par Nurse Practitioner location:office\par Patient location:home\par Individuals on call: Jocelyne Cook MARRYMATTHEW, Ms. CATRACHITO COMER\par \par Ms Catrachito Comer is now 16 weeks pregnant.\par She is doing well with no reported interval seizures\par \par levetiracetam 500 mg tabs 1.5 tabs po BID\par \par \par \par ***UPDATE:4/1/2022***\par Appointment was conducted by audiovisual/telehealth at request of patient in place of a follow up office visit due to heightened concern for Corona virus infection risk.\par Verbal consent given on Apr 04, 2022 6:47 AM by the patient Ms. CATRACHITO COMER Jul 23 1995 who understands that the telephone visit will be charged to insurance and may involve co-pay for patient\par Nurse Practitioner location:office\par Patient location:home\par Individuals on call: Jocelyne Cook MARRYMATTHEW, Ms. CATRACHITO COMER\par \par Ms Catrachito Comer Is doing well with no interval seizures. She is currently 8 weeks pregnant.\par \par \par Levetiracetam 500 mg BID\par \par ***UPDATE: 1/26/2022***\par Appointment was conducted by audiovisual/telehealth at request of patient in place of a follow up office visit due to heightened concern for Corona virus infection risk.\par Verbal consent given on Jan 26, 2022 2:26 PM by the patient Ms. CATRACHITO COMER Jul 23 1995 who understands that the telephone visit will be charged to insurance and may involve co-pay for patient\par Nurse Practitioner location:office\par Patient location:home\par Individuals on call: DARIUS Pedro, Ms. 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 She is followed by a psychiatrist and therapist and doing well\par \par \par \par 8/30/2021\par Ms Catrachito Comer is doing well with no recent documented seizures. Her last seizure was in the setting of Xanax withdrawal in Feb 2015\par \par Levetiracetam 500mg BID\par \par Hx\par The patient is 26year-old right handed woman with seizures starting at age 1, with generalized tonic clonic seizures.  She was placed on medication at age 1, and at some point was diagnosed with petit mal around age 3, with abnormal EEG.  She started on Zarontin but was allergic.  She was tried on Dilantin, phenobarbital, neurontin, Topamax and none worked well and there were behavioral side effects.\par \par She was stable on lamictal by age 16 to the point where it was tapered last year at age 18 since she was doing well.  No EEGs were performed.  She had an MRI at [facility] but magdaleno not have result, from 2014.\par \par She had one GTC at a club with strobe lights while on Lamictal in 2012.  She feels jumpy when she looks at strobe lights.  \par \par It is unclear if she is continuing to have petit mal spells.  Her most recent convulsion was  in the setting of Xanax withdrawal and it was severe with three repetitive seizures..  In November 2014, while using drugs, but not withdrawing, she had a witnessed seizure at work and she had soft tissue injuries.   \par Last seizure in Feb 2015 \par \par She feels irritable on Keppra after this was started in November 2014, and would like to consider going back to Lamictal since she states her mood was better.\par \par She was born with umbilical cord around her neck, and she had some special education classes.  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

## 2022-05-27 LAB — LEVETIRACETAM SERPL-MCNC: 12.1 UG/ML

## 2022-06-22 NOTE — PLAN
6/22/2022 Amy presents for colonoscopy follow-up.  We were able to obtain her noncontrasted CT scan from Dr. Venegas.  She did have a vaginal lesion and a lower abdominal wall lesion that was external.  These were removed surgically and she did develop a keloid.  Her colonoscopy is normal other than a benign polyp and normal random biopsies.  Her diarrhea has resolved.  Her lower abdominal pain is positional with lifting.  She feels that this is related to her hips.  Today she is doing well otherwise with no new GI complaints.  MB  [FreeTextEntry1] : Annual: exam and orders as above\par \par TTC Counseling: counseled on increased Folic Acid + PNVs with h/o Seizure medications, decreasing  olanzopine meds,and timing intercourse appropriately and with LH kits \par \par GYN counseling: Patient instructed to call for Unusual Pelvic pain,  UTI symptoms, irregular vaginal bleeding/discharge- Patient verbalized agreement\par \par F/U: patient to follow up in one year for annual GYN exam unless otherwise needed\par \par \par

## 2022-06-23 ENCOUNTER — NON-APPOINTMENT (OUTPATIENT)
Age: 27
End: 2022-06-23

## 2022-06-23 ENCOUNTER — ASOB RESULT (OUTPATIENT)
Age: 27
End: 2022-06-23

## 2022-06-23 ENCOUNTER — APPOINTMENT (OUTPATIENT)
Dept: ANTEPARTUM | Facility: CLINIC | Age: 27
End: 2022-06-23
Payer: MEDICAID

## 2022-06-23 PROCEDURE — 76811 OB US DETAILED SNGL FETUS: CPT

## 2022-06-24 ENCOUNTER — APPOINTMENT (OUTPATIENT)
Dept: OBGYN | Facility: CLINIC | Age: 27
End: 2022-06-24
Payer: MEDICAID

## 2022-06-24 ENCOUNTER — LABORATORY RESULT (OUTPATIENT)
Age: 27
End: 2022-06-24

## 2022-06-24 PROCEDURE — 0502F SUBSEQUENT PRENATAL CARE: CPT

## 2022-06-29 ENCOUNTER — NON-APPOINTMENT (OUTPATIENT)
Age: 27
End: 2022-06-29

## 2022-06-30 ENCOUNTER — NON-APPOINTMENT (OUTPATIENT)
Age: 27
End: 2022-06-30

## 2022-07-14 ENCOUNTER — TRANSCRIPTION ENCOUNTER (OUTPATIENT)
Age: 27
End: 2022-07-14

## 2022-07-14 ENCOUNTER — NON-APPOINTMENT (OUTPATIENT)
Age: 27
End: 2022-07-14

## 2022-07-18 ENCOUNTER — TRANSCRIPTION ENCOUNTER (OUTPATIENT)
Age: 27
End: 2022-07-18

## 2022-07-21 ENCOUNTER — NON-APPOINTMENT (OUTPATIENT)
Age: 27
End: 2022-07-21

## 2022-07-22 ENCOUNTER — TRANSCRIPTION ENCOUNTER (OUTPATIENT)
Age: 27
End: 2022-07-22

## 2022-07-22 ENCOUNTER — APPOINTMENT (OUTPATIENT)
Dept: OBGYN | Facility: CLINIC | Age: 27
End: 2022-07-22

## 2022-07-26 ENCOUNTER — TRANSCRIPTION ENCOUNTER (OUTPATIENT)
Age: 27
End: 2022-07-26

## 2022-07-29 ENCOUNTER — APPOINTMENT (OUTPATIENT)
Dept: OBGYN | Facility: CLINIC | Age: 27
End: 2022-07-29

## 2022-07-29 DIAGNOSIS — Z34.90 ENCOUNTER FOR SUPERVISION OF NORMAL PREGNANCY, UNSPECIFIED, UNSPECIFIED TRIMESTER: ICD-10-CM

## 2022-07-29 PROCEDURE — 0502F SUBSEQUENT PRENATAL CARE: CPT

## 2022-08-01 ENCOUNTER — TRANSCRIPTION ENCOUNTER (OUTPATIENT)
Age: 27
End: 2022-08-01

## 2022-08-01 LAB
BASOPHILS # BLD AUTO: 0.02 K/UL
BASOPHILS NFR BLD AUTO: 0.2 %
EOSINOPHIL # BLD AUTO: 0 K/UL
EOSINOPHIL NFR BLD AUTO: 0 %
GLUCOSE 1H P 50 G GLC PO SERPL-MCNC: 151 MG/DL
HCT VFR BLD CALC: 33.9 %
HGB BLD-MCNC: 11.2 G/DL
IMM GRANULOCYTES NFR BLD AUTO: 1 %
LYMPHOCYTES # BLD AUTO: 0.95 K/UL
LYMPHOCYTES NFR BLD AUTO: 10.5 %
MAN DIFF?: NORMAL
MCHC RBC-ENTMCNC: 31.4 PG
MCHC RBC-ENTMCNC: 33 GM/DL
MCV RBC AUTO: 95 FL
MONOCYTES # BLD AUTO: 0.54 K/UL
MONOCYTES NFR BLD AUTO: 6 %
NEUTROPHILS # BLD AUTO: 7.41 K/UL
NEUTROPHILS NFR BLD AUTO: 82.3 %
PLATELET # BLD AUTO: 182 K/UL
RBC # BLD: 3.57 M/UL
RBC # FLD: 12.3 %
WBC # FLD AUTO: 9.01 K/UL

## 2022-08-02 ENCOUNTER — NON-APPOINTMENT (OUTPATIENT)
Age: 27
End: 2022-08-02

## 2022-08-02 RX ORDER — LANCETS 33 GAUGE
EACH MISCELLANEOUS
Qty: 120 | Refills: 1 | Status: ACTIVE | COMMUNITY
Start: 2022-08-02 | End: 1900-01-01

## 2022-08-02 RX ORDER — BLOOD-GLUCOSE METER
W/DEVICE EACH MISCELLANEOUS
Qty: 1 | Refills: 0 | Status: ACTIVE | COMMUNITY
Start: 2022-08-02 | End: 1900-01-01

## 2022-08-02 RX ORDER — BLOOD SUGAR DIAGNOSTIC
STRIP MISCELLANEOUS
Qty: 120 | Refills: 1 | Status: ACTIVE | COMMUNITY
Start: 2022-08-02 | End: 1900-01-01

## 2022-08-02 RX ORDER — ADHESIVE TAPE 3"X 2.3 YD
2"X2" TAPE, NON-MEDICATED TOPICAL
Qty: 120 | Refills: 1 | Status: ACTIVE | COMMUNITY
Start: 2022-08-02 | End: 1900-01-01

## 2022-08-02 RX ORDER — ISOPROPYL ALCOHOL 70 ML/100ML
SWAB TOPICAL
Qty: 120 | Refills: 1 | Status: ACTIVE | COMMUNITY
Start: 2022-08-02 | End: 1900-01-01

## 2022-08-03 ENCOUNTER — NON-APPOINTMENT (OUTPATIENT)
Age: 27
End: 2022-08-03

## 2022-08-03 ENCOUNTER — TRANSCRIPTION ENCOUNTER (OUTPATIENT)
Age: 27
End: 2022-08-03

## 2022-08-03 RX ORDER — BLOOD SUGAR DIAGNOSTIC
STRIP MISCELLANEOUS 4 TIMES DAILY
Qty: 100 | Refills: 0 | Status: ACTIVE | COMMUNITY
Start: 2022-08-03 | End: 1900-01-01

## 2022-08-03 RX ORDER — LANCETS 28 GAUGE
EACH MISCELLANEOUS
Qty: 100 | Refills: 0 | Status: ACTIVE | COMMUNITY
Start: 2022-08-03 | End: 1900-01-01

## 2022-08-03 RX ORDER — BLOOD-GLUCOSE METER
KIT MISCELLANEOUS
Qty: 1 | Refills: 0 | Status: ACTIVE | COMMUNITY
Start: 2022-08-03 | End: 1900-01-01

## 2022-08-05 ENCOUNTER — TRANSCRIPTION ENCOUNTER (OUTPATIENT)
Age: 27
End: 2022-08-05

## 2022-08-15 ENCOUNTER — NON-APPOINTMENT (OUTPATIENT)
Age: 27
End: 2022-08-15

## 2022-08-16 ENCOUNTER — NON-APPOINTMENT (OUTPATIENT)
Age: 27
End: 2022-08-16

## 2022-08-16 ENCOUNTER — TRANSCRIPTION ENCOUNTER (OUTPATIENT)
Age: 27
End: 2022-08-16

## 2022-08-19 ENCOUNTER — APPOINTMENT (OUTPATIENT)
Dept: OBGYN | Facility: CLINIC | Age: 27
End: 2022-08-19

## 2022-08-19 PROCEDURE — 0502F SUBSEQUENT PRENATAL CARE: CPT

## 2022-08-22 ENCOUNTER — TRANSCRIPTION ENCOUNTER (OUTPATIENT)
Age: 27
End: 2022-08-22

## 2022-08-24 ENCOUNTER — TRANSCRIPTION ENCOUNTER (OUTPATIENT)
Age: 27
End: 2022-08-24

## 2022-08-24 LAB — LEVETIRACETAM SERPL-MCNC: 6.5 UG/ML

## 2022-08-25 ENCOUNTER — APPOINTMENT (OUTPATIENT)
Dept: NEUROLOGY | Facility: CLINIC | Age: 27
End: 2022-08-25

## 2022-08-25 PROCEDURE — 99212 OFFICE O/P EST SF 10 MIN: CPT | Mod: 95

## 2022-08-25 PROCEDURE — 99202 OFFICE O/P NEW SF 15 MIN: CPT | Mod: 95

## 2022-08-25 NOTE — DISCUSSION/SUMMARY
[FreeTextEntry1] : Patient with history of seizures as  a child and recent GTCs one at least in setting of benzo withdrawal. \par last event Feb 2015 \par mild mood effects from Keppra according to mother\par difficulty staying asleep (some anxiety)\par currently in substance abuse program (Anorexia in last year of highs school) related to break up with patient)\par \par \par Plan:\par increase [id number] tab BID  (switch from 500 t0 1000mg tabs\par repeat Levetiracetm levels\par reviewed seizure triggers counselled on ETOH/ substance abuse\par follow up in 4- 6 weeks\par \par   [Medically Refractory (seizure within the last year)] : Medically Refractory (seizure within the last year) [Risks Associated with Driving/[place] Law] : As per my usual protocol, the patient was advised in regards to risks and driving privileges associated with the [place] Guidelines.  [Safety Recommendations] : The patient was advised in regards to the risk of seizures and general seizure safety recommendations including not to be bathing alone, climbing to high places and operating heavy machinery. [Compliance with Medications] : The importance of compliance with medications was reinforced. [Medication Side Effects] : High frequency and serious potential medication adverse effects were reviewed with the patient, including but not exclusive to psychiatric effects.  Information sheets on medication side effects were made available to the patient in our clinic.  The patient or advocate agrees to notify us for any concerns. [Teratogenicity] : Risks associated with AED use in pregnancy, teratogenicity and methods of contraception were discussed.

## 2022-08-25 NOTE — HISTORY OF PRESENT ILLNESS
[FreeTextEntry1] : Appointment was conducted by audiovisual/telehealth at request of patient in place of a follow up office visit due to heightened concern for Corona virus infection risk.\par Verbal consent given on Aug 25, 2022 11:12 AM by the patient Ms. CATRACHITO COMER Jul 23 1995 who understands that the telephone visit will be charged to insurance and may involve co-pay for patient\par Nurse Practitioner location:office\par Patient location:home\par Individuals on call: Jocelyne Cook MARRYMATTHEW, Ms. CATRACHITO COMER\par \par No reported interval seizures now 29 weeks pregnant\par \par Levetiracetam 500 mg 1.5 tabs BID\par 7/26 Levetiracetam level 6.5\par \par SANDHYA 11/6/2022\par \par \par ***UPDATE:5/26/2022***\par Appointment was conducted by audiovisual/telehealth at request of patient in place of a follow up office visit due to heightened concern for Corona virus infection risk.\par Verbal consent given on May 26, 2022 11:31 AM by the patient Ms. CATRACHITO COMER Jul 23 1995 who understands that the telephone visit will be charged to insurance and may involve co-pay for patient\par Nurse Practitioner location:office\par Patient location:home\par Individuals on call: Jocelyne HolbrookDARIUS morton, Ms. CATRACHITO COMER\par \par Ms Catrachito Comer is now 16 weeks pregnant.\par She is doing well with no reported interval seizures\par \par levetiracetam 500 mg tabs 1.5 tabs po BID\par \par \par \par ***UPDATE:4/1/2022***\par Appointment was conducted by audiovisual/telehealth at request of patient in place of a follow up office visit due to heightened concern for Corona virus infection risk.\par Verbal consent given on Apr 04, 2022 6:47 AM by the patient Ms. CATRACHITO COMER Jul 23 1995 who understands that the telephone visit will be charged to insurance and may involve co-pay for patient\par Nurse Practitioner location:office\par Patient location:home\par Individuals on call: Jocelyne HolbrookDARIUS morton, Ms. CATRACHITO COMER\par \par Ms Catrachito Comer Is doing well with no interval seizures. She is currently 8 weeks pregnant.\par \par \par Levetiracetam 500 mg BID\par \par ***UPDATE: 1/26/2022***\par Appointment was conducted by audiovisual/telehealth at request of patient in place of a follow up office visit due to heightened concern for Corona virus infection risk.\par Verbal consent given on Jan 26, 2022 2:26 PM by the patient Ms. CATRACHITO COMER Jul 23 1995 who understands that the telephone visit will be charged to insurance and may involve co-pay for patient\par Nurse Practitioner location:office\par Patient location:home\par Individuals on call: DARIUS Pedro, Ms. 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 She is followed by a psychiatrist and therapist and doing well\par \par \par \par 8/30/2021\par Ms Catrachito Comer is doing well with no recent documented seizures. Her last seizure was in the setting of Xanax withdrawal in Feb 2015\par \par Levetiracetam 500mg BID\par \par Hx\par The patient is 26year-old right handed woman with seizures starting at age 1, with generalized tonic clonic seizures.  She was placed on medication at age 1, and at some point was diagnosed with petit mal around age 3, with abnormal EEG.  She started on Zarontin but was allergic.  She was tried on Dilantin, phenobarbital, neurontin, Topamax and none worked well and there were behavioral side effects.\par \par She was stable on lamictal by age 16 to the point where it was tapered last year at age 18 since she was doing well.  No EEGs were performed.  She had an MRI at [facility] but magdaleno not have result, from 2014.\par \par She had one GTC at a club with strobe lights while on Lamictal in 2012.  She feels jumpy when she looks at strobe lights.  \par \par It is unclear if she is continuing to have petit mal spells.  Her most recent convulsion was  in the setting of Xanax withdrawal and it was severe with three repetitive seizures..  In November 2014, while using drugs, but not withdrawing, she had a witnessed seizure at work and she had soft tissue injuries.   \par Last seizure in Feb 2015 \par \par She feels irritable on Keppra after this was started in November 2014, and would like to consider going back to Lamictal since she states her mood was better.\par \par She was born with umbilical cord around her neck, and she had some special education classes.  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  [Febrile Seizures] : febrile seizures

## 2022-08-25 NOTE — PHYSICAL EXAM
[General Appearance - Alert] : alert [General Appearance - In No Acute Distress] : in no acute distress [Oriented To Time, Place, And Person] : oriented to person, place, and time [Impaired Insight] : insight and judgment were intact [Affect] : the affect was normal [Person] : oriented to person [Place] : oriented to place [Time] : oriented to time [Short Term Intact] : short term memory intact [Remote Intact] : remote memory intact [Registration Intact] : recent registration memory intact [Span Intact] : the attention span was normal [Concentration Intact] : normal concentrating ability [Visual Intact] : visual attention was ~T not ~L decreased [Naming Objects] : no difficulty naming common objects [Repeating Phrases] : no difficulty repeating a phrase [Writing A Sentence] : no difficulty writing a sentence [Fluency] : fluency intact [Comprehension] : comprehension intact [Reading] : reading intact [Current Events] : adequate knowledge of current events [Past History] : adequate knowledge of personal past history [Vocabulary] : adequate range of vocabulary [Cranial Nerves Optic (II)] : visual acuity intact bilaterally,  visual fields full to confrontation, pupils equal round and reactive to light [Cranial Nerves Oculomotor (III)] : extraocular motion intact [Cranial Nerves Trigeminal (V)] : facial sensation intact symmetrically [Cranial Nerves Facial (VII)] : face symmetrical [Cranial Nerves Vestibulocochlear (VIII)] : hearing was intact bilaterally [Cranial Nerves Glossopharyngeal (IX)] : tongue and palate midline [Cranial Nerves Accessory (XI - Cranial And Spinal)] : head turning and shoulder shrug symmetric [Cranial Nerves Hypoglossal (XII)] : there was no tongue deviation with protrusion [Motor Tone] : muscle tone was normal in all four extremities [Motor Strength] : muscle strength was normal in all four extremities [No Muscle Atrophy] : normal bulk in all four extremities [Sensation Tactile Decrease] : light touch was intact [Sensation Pain / Temperature Decrease] : pain and temperature was intact [Sensation Vibration Decrease] : vibration was intact [Proprioception] : proprioception was intact [Abnormal Walk] : normal gait [Balance] : balance was intact [Past-pointing] : there was no past-pointing [Tremor] : no tremor present [2+] : Ankle jerk left 2+ [Plantar Reflex Right Only] : normal on the right [Plantar Reflex Left Only] : normal on the left [Sclera] : the sclera and conjunctiva were normal [PERRL With Normal Accommodation] : pupils were equal in size, round, reactive to light, with normal accommodation [Extraocular Movements] : extraocular movements were intact [Outer Ear] : the ears and nose were normal in appearance [Oropharynx] : the oropharynx was normal

## 2022-08-27 ENCOUNTER — TRANSCRIPTION ENCOUNTER (OUTPATIENT)
Age: 27
End: 2022-08-27

## 2022-09-01 ENCOUNTER — NON-APPOINTMENT (OUTPATIENT)
Age: 27
End: 2022-09-01

## 2022-09-01 ENCOUNTER — APPOINTMENT (OUTPATIENT)
Dept: ANTEPARTUM | Facility: CLINIC | Age: 27
End: 2022-09-01

## 2022-09-01 ENCOUNTER — ASOB RESULT (OUTPATIENT)
Age: 27
End: 2022-09-01

## 2022-09-01 PROCEDURE — 76819 FETAL BIOPHYS PROFIL W/O NST: CPT | Mod: 59

## 2022-09-01 PROCEDURE — 76816 OB US FOLLOW-UP PER FETUS: CPT

## 2022-09-02 ENCOUNTER — APPOINTMENT (OUTPATIENT)
Dept: OBGYN | Facility: CLINIC | Age: 27
End: 2022-09-02

## 2022-09-02 DIAGNOSIS — Z34.90 ENCOUNTER FOR SUPERVISION OF NORMAL PREGNANCY, UNSPECIFIED, UNSPECIFIED TRIMESTER: ICD-10-CM

## 2022-09-02 PROCEDURE — 0502F SUBSEQUENT PRENATAL CARE: CPT

## 2022-09-08 ENCOUNTER — APPOINTMENT (OUTPATIENT)
Dept: ANTEPARTUM | Facility: CLINIC | Age: 27
End: 2022-09-08

## 2022-09-16 ENCOUNTER — NON-APPOINTMENT (OUTPATIENT)
Age: 27
End: 2022-09-16

## 2022-09-16 ENCOUNTER — APPOINTMENT (OUTPATIENT)
Dept: OBGYN | Facility: CLINIC | Age: 27
End: 2022-09-16

## 2022-09-16 VITALS
HEIGHT: 68 IN | DIASTOLIC BLOOD PRESSURE: 91 MMHG | SYSTOLIC BLOOD PRESSURE: 155 MMHG | BODY MASS INDEX: 25.76 KG/M2 | WEIGHT: 170 LBS

## 2022-09-16 PROCEDURE — 0502F SUBSEQUENT PRENATAL CARE: CPT

## 2022-09-30 ENCOUNTER — APPOINTMENT (OUTPATIENT)
Dept: OBGYN | Facility: CLINIC | Age: 27
End: 2022-09-30

## 2022-09-30 DIAGNOSIS — O13.3 GESTATIONAL [PREGNANCY-INDUCED] HYPERTENSION W/OUT SIGNIFICANT PROTEINURIA, THIRD TRIMESTER: ICD-10-CM

## 2022-09-30 PROCEDURE — 0502F SUBSEQUENT PRENATAL CARE: CPT

## 2022-10-03 ENCOUNTER — NON-APPOINTMENT (OUTPATIENT)
Age: 27
End: 2022-10-03

## 2022-10-03 LAB
ALBUMIN SERPL ELPH-MCNC: 3.5 G/DL
ALP BLD-CCNC: 121 U/L
ALT SERPL-CCNC: 19 U/L
ANION GAP SERPL CALC-SCNC: 14 MMOL/L
APTT BLD: 26.7 SEC
AST SERPL-CCNC: 28 U/L
BASOPHILS # BLD AUTO: 0.02 K/UL
BASOPHILS NFR BLD AUTO: 0.2 %
BILIRUB DIRECT SERPL-MCNC: 0.1 MG/DL
BILIRUB SERPL-MCNC: 0.2 MG/DL
BUN SERPL-MCNC: 6 MG/DL
CALCIUM SERPL-MCNC: 8.8 MG/DL
CHLORIDE SERPL-SCNC: 104 MMOL/L
CO2 SERPL-SCNC: 20 MMOL/L
CREAT SERPL-MCNC: 0.6 MG/DL
CREAT SPEC-SCNC: 25 MG/DL
EGFR: 125 ML/MIN/1.73M2
EOSINOPHIL # BLD AUTO: 0 K/UL
EOSINOPHIL NFR BLD AUTO: 0 %
GLUCOSE SERPL-MCNC: 106 MG/DL
HCT VFR BLD CALC: 32.9 %
HGB BLD-MCNC: 10.9 G/DL
IMM GRANULOCYTES NFR BLD AUTO: 1.1 %
INR PPP: 0.97 RATIO
LDH SERPL-CCNC: 311 U/L
LYMPHOCYTES # BLD AUTO: 1.34 K/UL
LYMPHOCYTES NFR BLD AUTO: 14.1 %
MAN DIFF?: NORMAL
MCHC RBC-ENTMCNC: 29.6 PG
MCHC RBC-ENTMCNC: 33.1 GM/DL
MCV RBC AUTO: 89.4 FL
MONOCYTES # BLD AUTO: 0.61 K/UL
MONOCYTES NFR BLD AUTO: 6.4 %
NEUTROPHILS # BLD AUTO: 7.44 K/UL
NEUTROPHILS NFR BLD AUTO: 78.2 %
PLATELET # BLD AUTO: 205 K/UL
POTASSIUM SERPL-SCNC: 3.9 MMOL/L
PROT SERPL-MCNC: 5.8 G/DL
PROT UR-MCNC: <4 MG/DL
PT BLD: 11.4 SEC
RBC # BLD: 3.68 M/UL
RBC # FLD: 11.9 %
SODIUM SERPL-SCNC: 139 MMOL/L
URATE SERPL-MCNC: 4.1 MG/DL
WBC # FLD AUTO: 9.51 K/UL

## 2022-10-05 ENCOUNTER — NON-APPOINTMENT (OUTPATIENT)
Age: 27
End: 2022-10-05

## 2022-10-06 ENCOUNTER — TRANSCRIPTION ENCOUNTER (OUTPATIENT)
Age: 27
End: 2022-10-06

## 2022-10-07 ENCOUNTER — APPOINTMENT (OUTPATIENT)
Dept: OBGYN | Facility: CLINIC | Age: 27
End: 2022-10-07

## 2022-10-10 ENCOUNTER — NON-APPOINTMENT (OUTPATIENT)
Age: 27
End: 2022-10-10

## 2022-10-13 ENCOUNTER — APPOINTMENT (OUTPATIENT)
Dept: ANTEPARTUM | Facility: CLINIC | Age: 27
End: 2022-10-13

## 2022-10-13 ENCOUNTER — APPOINTMENT (OUTPATIENT)
Dept: NEUROLOGY | Facility: CLINIC | Age: 27
End: 2022-10-13

## 2022-10-13 ENCOUNTER — ASOB RESULT (OUTPATIENT)
Age: 27
End: 2022-10-13

## 2022-10-13 PROCEDURE — 76819 FETAL BIOPHYS PROFIL W/O NST: CPT | Mod: 59

## 2022-10-13 PROCEDURE — 76816 OB US FOLLOW-UP PER FETUS: CPT

## 2022-10-14 ENCOUNTER — TRANSCRIPTION ENCOUNTER (OUTPATIENT)
Age: 27
End: 2022-10-14

## 2022-10-14 ENCOUNTER — APPOINTMENT (OUTPATIENT)
Dept: OBGYN | Facility: CLINIC | Age: 27
End: 2022-10-14

## 2022-10-14 DIAGNOSIS — Z3A.36 36 WEEKS GESTATION OF PREGNANCY: ICD-10-CM

## 2022-10-14 PROCEDURE — 0502F SUBSEQUENT PRENATAL CARE: CPT

## 2022-10-17 ENCOUNTER — APPOINTMENT (OUTPATIENT)
Dept: NEUROLOGY | Facility: CLINIC | Age: 27
End: 2022-10-17

## 2022-10-17 LAB — B-HEM STREP SPEC QL CULT: NORMAL

## 2022-10-17 PROCEDURE — 99212 OFFICE O/P EST SF 10 MIN: CPT | Mod: 95

## 2022-10-17 PROCEDURE — 99202 OFFICE O/P NEW SF 15 MIN: CPT | Mod: 95

## 2022-10-18 ENCOUNTER — INPATIENT (INPATIENT)
Facility: HOSPITAL | Age: 27
LOS: 2 days | Discharge: HOME | End: 2022-10-21
Attending: STUDENT IN AN ORGANIZED HEALTH CARE EDUCATION/TRAINING PROGRAM | Admitting: STUDENT IN AN ORGANIZED HEALTH CARE EDUCATION/TRAINING PROGRAM

## 2022-10-18 ENCOUNTER — TRANSCRIPTION ENCOUNTER (OUTPATIENT)
Age: 27
End: 2022-10-18

## 2022-10-18 VITALS — HEART RATE: 65 BPM | DIASTOLIC BLOOD PRESSURE: 57 MMHG | SYSTOLIC BLOOD PRESSURE: 118 MMHG

## 2022-10-18 DIAGNOSIS — O13.3 GESTATIONAL [PREGNANCY-INDUCED] HYPERTENSION WITHOUT SIGNIFICANT PROTEINURIA, THIRD TRIMESTER: ICD-10-CM

## 2022-10-18 LAB
ALBUMIN SERPL ELPH-MCNC: 2.8 G/DL — LOW (ref 3.3–5)
ALBUMIN SERPL ELPH-MCNC: 3.4 G/DL — SIGNIFICANT CHANGE UP (ref 3.3–5)
ALP SERPL-CCNC: 138 U/L — HIGH (ref 40–120)
ALP SERPL-CCNC: 154 U/L — HIGH (ref 40–120)
ALT FLD-CCNC: 10 U/L — SIGNIFICANT CHANGE UP (ref 4–33)
ALT FLD-CCNC: 12 U/L — SIGNIFICANT CHANGE UP (ref 4–33)
ANION GAP SERPL CALC-SCNC: 10 MMOL/L — SIGNIFICANT CHANGE UP (ref 7–14)
ANION GAP SERPL CALC-SCNC: 11 MMOL/L — SIGNIFICANT CHANGE UP (ref 7–14)
APPEARANCE UR: CLEAR — SIGNIFICANT CHANGE UP
APTT BLD: 25.6 SEC — LOW (ref 27–36.3)
APTT BLD: 25.9 SEC — LOW (ref 27–36.3)
AST SERPL-CCNC: 17 U/L — SIGNIFICANT CHANGE UP (ref 4–32)
AST SERPL-CCNC: 19 U/L — SIGNIFICANT CHANGE UP (ref 4–32)
BASOPHILS # BLD AUTO: 0.03 K/UL — SIGNIFICANT CHANGE UP (ref 0–0.2)
BASOPHILS # BLD AUTO: 0.03 K/UL — SIGNIFICANT CHANGE UP (ref 0–0.2)
BASOPHILS NFR BLD AUTO: 0.3 % — SIGNIFICANT CHANGE UP (ref 0–2)
BASOPHILS NFR BLD AUTO: 0.3 % — SIGNIFICANT CHANGE UP (ref 0–2)
BILIRUB SERPL-MCNC: 0.2 MG/DL — SIGNIFICANT CHANGE UP (ref 0.2–1.2)
BILIRUB SERPL-MCNC: 0.3 MG/DL — SIGNIFICANT CHANGE UP (ref 0.2–1.2)
BILIRUB UR-MCNC: NEGATIVE — SIGNIFICANT CHANGE UP
BLD GP AB SCN SERPL QL: NEGATIVE — SIGNIFICANT CHANGE UP
BUN SERPL-MCNC: 4 MG/DL — LOW (ref 7–23)
BUN SERPL-MCNC: 7 MG/DL — SIGNIFICANT CHANGE UP (ref 7–23)
CALCIUM SERPL-MCNC: 8.8 MG/DL — SIGNIFICANT CHANGE UP (ref 8.4–10.5)
CALCIUM SERPL-MCNC: 8.9 MG/DL — SIGNIFICANT CHANGE UP (ref 8.4–10.5)
CHLORIDE SERPL-SCNC: 103 MMOL/L — SIGNIFICANT CHANGE UP (ref 98–107)
CHLORIDE SERPL-SCNC: 104 MMOL/L — SIGNIFICANT CHANGE UP (ref 98–107)
CO2 SERPL-SCNC: 23 MMOL/L — SIGNIFICANT CHANGE UP (ref 22–31)
CO2 SERPL-SCNC: 23 MMOL/L — SIGNIFICANT CHANGE UP (ref 22–31)
COLOR SPEC: SIGNIFICANT CHANGE UP
COVID-19 SPIKE DOMAIN AB INTERP: POSITIVE
COVID-19 SPIKE DOMAIN ANTIBODY RESULT: >250 U/ML — HIGH
CREAT ?TM UR-MCNC: 49 MG/DL — SIGNIFICANT CHANGE UP
CREAT SERPL-MCNC: 0.54 MG/DL — SIGNIFICANT CHANGE UP (ref 0.5–1.3)
CREAT SERPL-MCNC: 0.55 MG/DL — SIGNIFICANT CHANGE UP (ref 0.5–1.3)
DIFF PNL FLD: NEGATIVE — SIGNIFICANT CHANGE UP
EGFR: 127 ML/MIN/1.73M2 — SIGNIFICANT CHANGE UP
EGFR: 128 ML/MIN/1.73M2 — SIGNIFICANT CHANGE UP
EOSINOPHIL # BLD AUTO: 0.01 K/UL — SIGNIFICANT CHANGE UP (ref 0–0.5)
EOSINOPHIL # BLD AUTO: 0.01 K/UL — SIGNIFICANT CHANGE UP (ref 0–0.5)
EOSINOPHIL NFR BLD AUTO: 0.1 % — SIGNIFICANT CHANGE UP (ref 0–6)
EOSINOPHIL NFR BLD AUTO: 0.1 % — SIGNIFICANT CHANGE UP (ref 0–6)
FIBRINOGEN PPP-MCNC: 620 MG/DL — HIGH (ref 330–520)
FIBRINOGEN PPP-MCNC: 648 MG/DL — HIGH (ref 330–520)
GLUCOSE SERPL-MCNC: 77 MG/DL — SIGNIFICANT CHANGE UP (ref 70–99)
GLUCOSE SERPL-MCNC: 77 MG/DL — SIGNIFICANT CHANGE UP (ref 70–99)
GLUCOSE UR QL: NEGATIVE — SIGNIFICANT CHANGE UP
HCT VFR BLD CALC: 30.6 % — LOW (ref 34.5–45)
HCT VFR BLD CALC: 30.8 % — LOW (ref 34.5–45)
HGB BLD-MCNC: 10.1 G/DL — LOW (ref 11.5–15.5)
HGB BLD-MCNC: 10.3 G/DL — LOW (ref 11.5–15.5)
IANC: 6.81 K/UL — SIGNIFICANT CHANGE UP (ref 1.8–7.4)
IANC: 7.18 K/UL — SIGNIFICANT CHANGE UP (ref 1.8–7.4)
IMM GRANULOCYTES NFR BLD AUTO: 0.7 % — SIGNIFICANT CHANGE UP (ref 0–0.9)
IMM GRANULOCYTES NFR BLD AUTO: 1.1 % — HIGH (ref 0–0.9)
INR BLD: 0.99 RATIO — SIGNIFICANT CHANGE UP (ref 0.88–1.16)
INR BLD: 1.04 RATIO — SIGNIFICANT CHANGE UP (ref 0.88–1.16)
KETONES UR-MCNC: NEGATIVE — SIGNIFICANT CHANGE UP
LDH SERPL L TO P-CCNC: 179 U/L — SIGNIFICANT CHANGE UP (ref 135–225)
LDH SERPL L TO P-CCNC: 205 U/L — SIGNIFICANT CHANGE UP (ref 135–225)
LEUKOCYTE ESTERASE UR-ACNC: NEGATIVE — SIGNIFICANT CHANGE UP
LYMPHOCYTES # BLD AUTO: 1.83 K/UL — SIGNIFICANT CHANGE UP (ref 1–3.3)
LYMPHOCYTES # BLD AUTO: 1.83 K/UL — SIGNIFICANT CHANGE UP (ref 1–3.3)
LYMPHOCYTES # BLD AUTO: 18.6 % — SIGNIFICANT CHANGE UP (ref 13–44)
LYMPHOCYTES # BLD AUTO: 18.9 % — SIGNIFICANT CHANGE UP (ref 13–44)
MCHC RBC-ENTMCNC: 28.3 PG — SIGNIFICANT CHANGE UP (ref 27–34)
MCHC RBC-ENTMCNC: 28.4 PG — SIGNIFICANT CHANGE UP (ref 27–34)
MCHC RBC-ENTMCNC: 33 GM/DL — SIGNIFICANT CHANGE UP (ref 32–36)
MCHC RBC-ENTMCNC: 33.4 GM/DL — SIGNIFICANT CHANGE UP (ref 32–36)
MCV RBC AUTO: 84.6 FL — SIGNIFICANT CHANGE UP (ref 80–100)
MCV RBC AUTO: 86 FL — SIGNIFICANT CHANGE UP (ref 80–100)
MONOCYTES # BLD AUTO: 0.7 K/UL — SIGNIFICANT CHANGE UP (ref 0–0.9)
MONOCYTES # BLD AUTO: 0.88 K/UL — SIGNIFICANT CHANGE UP (ref 0–0.9)
MONOCYTES NFR BLD AUTO: 7.1 % — SIGNIFICANT CHANGE UP (ref 2–14)
MONOCYTES NFR BLD AUTO: 9.1 % — SIGNIFICANT CHANGE UP (ref 2–14)
NEUTROPHILS # BLD AUTO: 6.81 K/UL — SIGNIFICANT CHANGE UP (ref 1.8–7.4)
NEUTROPHILS # BLD AUTO: 7.18 K/UL — SIGNIFICANT CHANGE UP (ref 1.8–7.4)
NEUTROPHILS NFR BLD AUTO: 70.5 % — SIGNIFICANT CHANGE UP (ref 43–77)
NEUTROPHILS NFR BLD AUTO: 73.2 % — SIGNIFICANT CHANGE UP (ref 43–77)
NITRITE UR-MCNC: NEGATIVE — SIGNIFICANT CHANGE UP
NRBC # BLD: 0 /100 WBCS — SIGNIFICANT CHANGE UP (ref 0–0)
NRBC # BLD: 0 /100 WBCS — SIGNIFICANT CHANGE UP (ref 0–0)
NRBC # FLD: 0 K/UL — SIGNIFICANT CHANGE UP (ref 0–0)
NRBC # FLD: 0 K/UL — SIGNIFICANT CHANGE UP (ref 0–0)
PH UR: 6.5 — SIGNIFICANT CHANGE UP (ref 5–8)
PLATELET # BLD AUTO: 143 K/UL — LOW (ref 150–400)
PLATELET # BLD AUTO: 177 K/UL — SIGNIFICANT CHANGE UP (ref 150–400)
POTASSIUM SERPL-MCNC: 3.8 MMOL/L — SIGNIFICANT CHANGE UP (ref 3.5–5.3)
POTASSIUM SERPL-MCNC: 4.1 MMOL/L — SIGNIFICANT CHANGE UP (ref 3.5–5.3)
POTASSIUM SERPL-SCNC: 3.8 MMOL/L — SIGNIFICANT CHANGE UP (ref 3.5–5.3)
POTASSIUM SERPL-SCNC: 4.1 MMOL/L — SIGNIFICANT CHANGE UP (ref 3.5–5.3)
PROT ?TM UR-MCNC: 7 MG/DL — SIGNIFICANT CHANGE UP
PROT ?TM UR-MCNC: 7 MG/DL — SIGNIFICANT CHANGE UP
PROT SERPL-MCNC: 5.2 G/DL — LOW (ref 6–8.3)
PROT SERPL-MCNC: 5.8 G/DL — LOW (ref 6–8.3)
PROT UR-MCNC: NEGATIVE — SIGNIFICANT CHANGE UP
PROT/CREAT UR-RTO: 0.2 RATIO — SIGNIFICANT CHANGE UP (ref 0–0.2)
PROTHROM AB SERPL-ACNC: 11.5 SEC — SIGNIFICANT CHANGE UP (ref 10.5–13.4)
PROTHROM AB SERPL-ACNC: 12.1 SEC — SIGNIFICANT CHANGE UP (ref 10.5–13.4)
RBC # BLD: 3.56 M/UL — LOW (ref 3.8–5.2)
RBC # BLD: 3.64 M/UL — LOW (ref 3.8–5.2)
RBC # FLD: 12.1 % — SIGNIFICANT CHANGE UP (ref 10.3–14.5)
RBC # FLD: 12.3 % — SIGNIFICANT CHANGE UP (ref 10.3–14.5)
RH IG SCN BLD-IMP: POSITIVE — SIGNIFICANT CHANGE UP
RH IG SCN BLD-IMP: POSITIVE — SIGNIFICANT CHANGE UP
SARS-COV-2 IGG+IGM SERPL QL IA: >250 U/ML — HIGH
SARS-COV-2 IGG+IGM SERPL QL IA: POSITIVE
SODIUM SERPL-SCNC: 137 MMOL/L — SIGNIFICANT CHANGE UP (ref 135–145)
SODIUM SERPL-SCNC: 137 MMOL/L — SIGNIFICANT CHANGE UP (ref 135–145)
SP GR SPEC: 1.01 — LOW (ref 1.01–1.05)
T PALLIDUM AB TITR SER: NEGATIVE — SIGNIFICANT CHANGE UP
URATE SERPL-MCNC: 4.4 MG/DL — SIGNIFICANT CHANGE UP (ref 2.5–7)
URATE SERPL-MCNC: 4.6 MG/DL — SIGNIFICANT CHANGE UP (ref 2.5–7)
UROBILINOGEN FLD QL: SIGNIFICANT CHANGE UP
WBC # BLD: 9.67 K/UL — SIGNIFICANT CHANGE UP (ref 3.8–10.5)
WBC # BLD: 9.82 K/UL — SIGNIFICANT CHANGE UP (ref 3.8–10.5)
WBC # FLD AUTO: 9.67 K/UL — SIGNIFICANT CHANGE UP (ref 3.8–10.5)
WBC # FLD AUTO: 9.82 K/UL — SIGNIFICANT CHANGE UP (ref 3.8–10.5)

## 2022-10-18 RX ORDER — OXYTOCIN 10 UNIT/ML
333.33 VIAL (ML) INJECTION
Qty: 20 | Refills: 0 | Status: DISCONTINUED | OUTPATIENT
Start: 2022-10-18 | End: 2022-10-21

## 2022-10-18 RX ORDER — OXYTOCIN 10 UNIT/ML
VIAL (ML) INJECTION
Qty: 30 | Refills: 0 | Status: DISCONTINUED | OUTPATIENT
Start: 2022-10-18 | End: 2022-10-19

## 2022-10-18 RX ORDER — CHLORHEXIDINE GLUCONATE 213 G/1000ML
1 SOLUTION TOPICAL ONCE
Refills: 0 | Status: DISCONTINUED | OUTPATIENT
Start: 2022-10-18 | End: 2022-10-18

## 2022-10-18 RX ORDER — CHLORHEXIDINE GLUCONATE 213 G/1000ML
1 SOLUTION TOPICAL ONCE
Refills: 0 | Status: COMPLETED | OUTPATIENT
Start: 2022-10-18 | End: 2022-10-19

## 2022-10-18 RX ORDER — INFLUENZA VIRUS VACCINE 15; 15; 15; 15 UG/.5ML; UG/.5ML; UG/.5ML; UG/.5ML
0.5 SUSPENSION INTRAMUSCULAR ONCE
Refills: 0 | Status: DISCONTINUED | OUTPATIENT
Start: 2022-10-18 | End: 2022-10-21

## 2022-10-18 RX ORDER — LEVETIRACETAM 250 MG/1
1000 TABLET, FILM COATED ORAL
Refills: 0 | Status: DISCONTINUED | OUTPATIENT
Start: 2022-10-18 | End: 2022-10-21

## 2022-10-18 RX ORDER — OXYTOCIN 10 UNIT/ML
333.33 VIAL (ML) INJECTION
Qty: 20 | Refills: 0 | Status: DISCONTINUED | OUTPATIENT
Start: 2022-10-18 | End: 2022-10-18

## 2022-10-18 RX ORDER — QUETIAPINE FUMARATE 200 MG/1
50 TABLET, FILM COATED ORAL
Refills: 0 | Status: DISCONTINUED | OUTPATIENT
Start: 2022-10-18 | End: 2022-10-19

## 2022-10-18 RX ORDER — SODIUM CHLORIDE 9 MG/ML
1000 INJECTION, SOLUTION INTRAVENOUS
Refills: 0 | Status: DISCONTINUED | OUTPATIENT
Start: 2022-10-18 | End: 2022-10-18

## 2022-10-18 RX ORDER — CITRIC ACID/SODIUM CITRATE 300-500 MG
15 SOLUTION, ORAL ORAL EVERY 6 HOURS
Refills: 0 | Status: DISCONTINUED | OUTPATIENT
Start: 2022-10-18 | End: 2022-10-18

## 2022-10-18 RX ORDER — SERTRALINE 25 MG/1
50 TABLET, FILM COATED ORAL DAILY
Refills: 0 | Status: DISCONTINUED | OUTPATIENT
Start: 2022-10-18 | End: 2022-10-21

## 2022-10-18 RX ORDER — DEXTROAMPHETAMINE SACCHARATE, AMPHETAMINE ASPARTATE, DEXTROAMPHETAMINE SULFATE AND AMPHETAMINE SULFATE 1.875; 1.875; 1.875; 1.875 MG/1; MG/1; MG/1; MG/1
1 TABLET ORAL
Qty: 0 | Refills: 0 | DISCHARGE

## 2022-10-18 RX ORDER — ONDANSETRON 8 MG/1
4 TABLET, FILM COATED ORAL ONCE
Refills: 0 | Status: COMPLETED | OUTPATIENT
Start: 2022-10-18 | End: 2022-10-18

## 2022-10-18 RX ORDER — CITRIC ACID/SODIUM CITRATE 300-500 MG
15 SOLUTION, ORAL ORAL EVERY 6 HOURS
Refills: 0 | Status: DISCONTINUED | OUTPATIENT
Start: 2022-10-18 | End: 2022-10-19

## 2022-10-18 RX ORDER — SODIUM CHLORIDE 9 MG/ML
1000 INJECTION, SOLUTION INTRAVENOUS
Refills: 0 | Status: DISCONTINUED | OUTPATIENT
Start: 2022-10-18 | End: 2022-10-19

## 2022-10-18 RX ADMIN — ONDANSETRON 4 MILLIGRAM(S): 8 TABLET, FILM COATED ORAL at 09:13

## 2022-10-18 RX ADMIN — QUETIAPINE FUMARATE 50 MILLIGRAM(S): 200 TABLET, FILM COATED ORAL at 15:59

## 2022-10-18 RX ADMIN — LEVETIRACETAM 1000 MILLIGRAM(S): 250 TABLET, FILM COATED ORAL at 15:59

## 2022-10-18 RX ADMIN — SODIUM CHLORIDE 125 MILLILITER(S): 9 INJECTION, SOLUTION INTRAVENOUS at 13:53

## 2022-10-18 RX ADMIN — SODIUM CHLORIDE 125 MILLILITER(S): 9 INJECTION, SOLUTION INTRAVENOUS at 06:30

## 2022-10-18 RX ADMIN — Medication 2 MILLIUNIT(S)/MIN: at 20:12

## 2022-10-18 NOTE — OB PROVIDER LABOR PROGRESS NOTE - ASSESSMENT
26yo  @37w IOL for gHTN  -Patient ctx too much for buccal/vaginal cytotec, consider PO  -FRT Cat 1, continue to monitor  -Continue current labor plan    Tony Andino PGY 1

## 2022-10-18 NOTE — OB PROVIDER LABOR PROGRESS NOTE - ASSESSMENT
28yo  @37w IOL for gHTN  Balloon still in place, continue to monitor FRT and CE as indicated. Continue current labor plan  Tony Andino PGY 1

## 2022-10-18 NOTE — OB RN PATIENT PROFILE - FUNCTIONAL ASSESSMENT - BASIC MOBILITY 6.
4-calculated by average/Not able to assess (calculate score using [facility] averaging method)

## 2022-10-18 NOTE — OB PROVIDER LABOR PROGRESS NOTE - ASSESSMENT
28yo  @37w IOL for gHTN  - s/p PO cytotec  - Start pitocin  - BPs wnl     Allie Dunham, PGY1  d/w Dr. Bateman

## 2022-10-18 NOTE — HISTORY OF PRESENT ILLNESS
[Febrile Seizures] : febrile seizures [FreeTextEntry1] : ***UPDATE:10/17/2022***\par Appointment was conducted by audiovisual/telehealth at request of patient in place of a follow up office visit due to heightened concern for Corona virus infection risk.\par Verbal consent given on Oct 18, 2022 3:04 PM by the patient Ms. CATRACHITO COMER Jul 23 1995 who understands that the telephone visit will be charged to insurance and may involve co-pay for patient\par Nurse Practitioner location:office\par Patient location:home\par Individuals on call: Jocelyne Cook MARRYMATTHEW, Ms. CATRACHITO COMER\par \par ~ 37 weeks pregnant  scheduled to be induced tomorrow\par No reported interval seizures\par \par \par \par Appointment was conducted by audiovisual/telehealth at request of patient in place of a follow up office visit due to heightened concern for Corona virus infection risk.\par Verbal consent given on Aug 25, 2022 11:12 AM by the patient Ms. CATRACHITO COMER Jul 23 1995 who understands that the telephone visit will be charged to insurance and may involve co-pay for patient\par Nurse Practitioner location:office\par Patient location:home\par Individuals on call: DARIUS Pedro, Ms. CATRACHITO COMER\par \par No reported interval seizures now 29 weeks pregnant  \par \par Levetiracetam 1000 mg  BID\par 7/26 Levetiracetam level 6.5\par \par \par \par \par ***UPDATE:5/26/2022***\par Appointment was conducted by audiovisual/telehealth at request of patient in place of a follow up office visit due to heightened concern for Corona virus infection risk.\par Verbal consent given on May 26, 2022 11:31 AM by the patient Ms. CATRACHITO COMER Jul 23 1995 who understands that the telephone visit will be charged to insurance and may involve co-pay for patient\par Nurse Practitioner location:office\par Patient location:home\par Individuals on call: DARIUS Pedro, Ms. CATRACHITO COMER\par \par Ms Catrachito Comer is now 16 weeks pregnant.\par She is doing well with no reported interval seizures\par \par levetiracetam 500 mg tabs 1.5 tabs po BID\par \par \par \par ***UPDATE:4/1/2022***\par Appointment was conducted by audiovisual/telehealth at request of patient in place of a follow up office visit due to heightened concern for Corona virus infection risk.\par Verbal consent given on Apr 04, 2022 6:47 AM by the patient Ms. CATRACHITO COMER Jul 23 1995 who understands that the telephone visit will be charged to insurance and may involve co-pay for patient\par Nurse Practitioner location:office\par Patient location:home\par Individuals on call: DARIUS Pedro, Ms. CATRACHITO COMER\par \par Ms Catrachito Comer Is doing well with no interval seizures. She is currently 8 weeks pregnant.\par \par \par Levetiracetam 500 mg BID\par \par ***UPDATE: 1/26/2022***\par Appointment was conducted by audiovisual/telehealth at request of patient in place of a follow up office visit due to heightened concern for Corona virus infection risk.\par Verbal consent given on Jan 26, 2022 2:26 PM by the patient Ms. CATRACHITO COMER Jul 23 1995 who understands that the telephone visit will be charged to insurance and may involve co-pay for patient\par Nurse Practitioner location:office\par Patient location:home\par Individuals on call: DARIUS Pedro, Ms. 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 She is followed by a psychiatrist and therapist and doing well\par \par \par \par 8/30/2021\par Ms Catrachito Comer is doing well with no recent documented seizures. Her last seizure was in the setting of Xanax withdrawal in Feb 2015\par \par Levetiracetam 500mg BID\par \par Hx\par The patient is 26year-old right handed woman with seizures starting at age 1, with generalized tonic clonic seizures.  She was placed on medication at age 1, and at some point was diagnosed with petit mal around age 3, with abnormal EEG.  She started on Zarontin but was allergic.  She was tried on Dilantin, phenobarbital, neurontin, Topamax and none worked well and there were behavioral side effects.\par \par She was stable on lamictal by age 16 to the point where it was tapered last year at age 18 since she was doing well.  No EEGs were performed.  She had an MRI at [facility] but magdaleno not have result, from 2014.\par \par She had one GTC at a club with strobe lights while on Lamictal in 2012.  She feels jumpy when she looks at strobe lights.  \par \par It is unclear if she is continuing to have petit mal spells.  Her most recent convulsion was  in the setting of Xanax withdrawal and it was severe with three repetitive seizures..  In November 2014, while using drugs, but not withdrawing, she had a witnessed seizure at work and she had soft tissue injuries.   \par Last seizure in Feb 2015 \par \par She feels irritable on Keppra after this was started in November 2014, and would like to consider going back to Lamictal since she states her mood was better.\par \par She was born with umbilical cord around her neck, and she had some special education classes.  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

## 2022-10-18 NOTE — OB PROVIDER H&P - ASSESSMENT
28yo  @37w p/w IOL for gHTN  -admit to L&D  - for BC/CB  - GBS neg  - EFW [id number]  - Routine Labs  - FHT/TOCO  - Anesthesia Consult  - Anticipate   - HELLP labs    Allie Dunham, PGY1  d/w Dr. Manzanares

## 2022-10-18 NOTE — DISCUSSION/SUMMARY
[Medically Refractory (seizure within the last year)] : Medically Refractory (seizure within the last year) [Risks Associated with Driving/[place] Law] : As per my usual protocol, the patient was advised in regards to risks and driving privileges associated with the [place] Guidelines.  [Safety Recommendations] : The patient was advised in regards to the risk of seizures and general seizure safety recommendations including not to be bathing alone, climbing to high places and operating heavy machinery. [Compliance with Medications] : The importance of compliance with medications was reinforced. [Medication Side Effects] : High frequency and serious potential medication adverse effects were reviewed with the patient, including but not exclusive to psychiatric effects.  Information sheets on medication side effects were made available to the patient in our clinic.  The patient or advocate agrees to notify us for any concerns. [Teratogenicity] : Risks associated with AED use in pregnancy, teratogenicity and methods of contraception were discussed.  [FreeTextEntry1] : Patient with history of seizures as  a child and recent GTCs one at least in setting of benzo withdrawal. \par last event Feb 2015 \par mild mood effects from Keppra according to mother\par difficulty staying asleep (some anxiety)\par currently in substance abuse program (Anorexia in last year of highs school) related to break up with patient)\par \par \par Plan:\par continue Keppra 1000 mg tab BID\par she was unable to get  repeat lKeppra levels as ordered\par scheduled to have baby tomorrow\par will follow up on Thursday to lower Keppra dose after delivery\par

## 2022-10-18 NOTE — OB PROVIDER LABOR PROGRESS NOTE - ASSESSMENT
26yo  @37 IOL for Reed  - AROM @1130p     28yo  @37w IOL for gHTN  - AROM @1130p  - continue paul Dunham, PGY1

## 2022-10-18 NOTE — OB PROVIDER H&P - HISTORY OF PRESENT ILLNESS
28yo  @37w  p/f IOL for gHTN. –VB, -LOF, -Ctx, +FM. denies fever, chills, nausea, vomiting, diarrhea, headache, constipation, dizziness, syncope, chest pain, palpitations, shortness of breath, dysuria, urgency, frequency.  PNC: gHTN  GBS: neg  EFW: 6#12  ObHx:  - TOP D&C  GynHx: -c/f/STI, hx of HPV+  MedHx: epilepsy, last seizure   SrgHx: denies  PsychHx: anxiety  SocialHx: denies  AllergyHx: augmentin, PCN - rash  RxHx: keppra 1000 BID, zoloft 50, seroquel 50, adderall 12.5

## 2022-10-18 NOTE — OB PROVIDER LABOR PROGRESS NOTE - ASSESSMENT
28yo  @37w IOL for gHTN  -CB placed successfully   -Patient for BC  -FRT Cat 1 continue to monitor  - Continue current labor plan    Tony Andino PGY 1

## 2022-10-19 ENCOUNTER — TRANSCRIPTION ENCOUNTER (OUTPATIENT)
Age: 27
End: 2022-10-19

## 2022-10-19 LAB
ALBUMIN SERPL ELPH-MCNC: 3.1 G/DL — LOW (ref 3.3–5)
ALP SERPL-CCNC: 146 U/L — HIGH (ref 40–120)
ALT FLD-CCNC: 11 U/L — SIGNIFICANT CHANGE UP (ref 4–33)
ANION GAP SERPL CALC-SCNC: 11 MMOL/L — SIGNIFICANT CHANGE UP (ref 7–14)
APTT BLD: 25.4 SEC — LOW (ref 27–36.3)
AST SERPL-CCNC: 31 U/L — SIGNIFICANT CHANGE UP (ref 4–32)
BASOPHILS # BLD AUTO: 0.03 K/UL — SIGNIFICANT CHANGE UP (ref 0–0.2)
BASOPHILS NFR BLD AUTO: 0.2 % — SIGNIFICANT CHANGE UP (ref 0–2)
BILIRUB SERPL-MCNC: 0.4 MG/DL — SIGNIFICANT CHANGE UP (ref 0.2–1.2)
BUN SERPL-MCNC: 5 MG/DL — LOW (ref 7–23)
CALCIUM SERPL-MCNC: 9 MG/DL — SIGNIFICANT CHANGE UP (ref 8.4–10.5)
CHLORIDE SERPL-SCNC: 104 MMOL/L — SIGNIFICANT CHANGE UP (ref 98–107)
CO2 SERPL-SCNC: 21 MMOL/L — LOW (ref 22–31)
CREAT SERPL-MCNC: 0.6 MG/DL — SIGNIFICANT CHANGE UP (ref 0.5–1.3)
EGFR: 125 ML/MIN/1.73M2 — SIGNIFICANT CHANGE UP
EOSINOPHIL # BLD AUTO: 0 K/UL — SIGNIFICANT CHANGE UP (ref 0–0.5)
EOSINOPHIL NFR BLD AUTO: 0 % — SIGNIFICANT CHANGE UP (ref 0–6)
FIBRINOGEN PPP-MCNC: 669 MG/DL — HIGH (ref 330–520)
GLUCOSE SERPL-MCNC: 106 MG/DL — HIGH (ref 70–99)
HCT VFR BLD CALC: 33 % — LOW (ref 34.5–45)
HGB BLD-MCNC: 11 G/DL — LOW (ref 11.5–15.5)
IANC: 14.84 K/UL — HIGH (ref 1.8–7.4)
IMM GRANULOCYTES NFR BLD AUTO: 0.9 % — SIGNIFICANT CHANGE UP (ref 0–0.9)
INR BLD: 0.97 RATIO — SIGNIFICANT CHANGE UP (ref 0.88–1.16)
LDH SERPL L TO P-CCNC: 246 U/L — HIGH (ref 135–225)
LYMPHOCYTES # BLD AUTO: 0.7 K/UL — LOW (ref 1–3.3)
LYMPHOCYTES # BLD AUTO: 4.2 % — LOW (ref 13–44)
MCHC RBC-ENTMCNC: 28.4 PG — SIGNIFICANT CHANGE UP (ref 27–34)
MCHC RBC-ENTMCNC: 33.3 GM/DL — SIGNIFICANT CHANGE UP (ref 32–36)
MCV RBC AUTO: 85.1 FL — SIGNIFICANT CHANGE UP (ref 80–100)
MONOCYTES # BLD AUTO: 0.88 K/UL — SIGNIFICANT CHANGE UP (ref 0–0.9)
MONOCYTES NFR BLD AUTO: 5.3 % — SIGNIFICANT CHANGE UP (ref 2–14)
NEUTROPHILS # BLD AUTO: 14.84 K/UL — HIGH (ref 1.8–7.4)
NEUTROPHILS NFR BLD AUTO: 89.4 % — HIGH (ref 43–77)
NRBC # BLD: 0 /100 WBCS — SIGNIFICANT CHANGE UP (ref 0–0)
NRBC # FLD: 0 K/UL — SIGNIFICANT CHANGE UP (ref 0–0)
PLATELET # BLD AUTO: 160 K/UL — SIGNIFICANT CHANGE UP (ref 150–400)
POTASSIUM SERPL-MCNC: 4.8 MMOL/L — SIGNIFICANT CHANGE UP (ref 3.5–5.3)
POTASSIUM SERPL-SCNC: 4.8 MMOL/L — SIGNIFICANT CHANGE UP (ref 3.5–5.3)
PROT SERPL-MCNC: 5.7 G/DL — LOW (ref 6–8.3)
PROTHROM AB SERPL-ACNC: 11.3 SEC — SIGNIFICANT CHANGE UP (ref 10.5–13.4)
RBC # BLD: 3.88 M/UL — SIGNIFICANT CHANGE UP (ref 3.8–5.2)
RBC # FLD: 12 % — SIGNIFICANT CHANGE UP (ref 10.3–14.5)
SODIUM SERPL-SCNC: 136 MMOL/L — SIGNIFICANT CHANGE UP (ref 135–145)
URATE SERPL-MCNC: 5.5 MG/DL — SIGNIFICANT CHANGE UP (ref 2.5–7)
WBC # BLD: 16.35 K/UL — HIGH (ref 3.8–10.5)
WBC # FLD AUTO: 16.35 K/UL — HIGH (ref 3.8–10.5)

## 2022-10-19 PROCEDURE — 59409 OBSTETRICAL CARE: CPT | Mod: AS,U8

## 2022-10-19 PROCEDURE — 59400 OBSTETRICAL CARE: CPT | Mod: U7

## 2022-10-19 RX ORDER — OXYCODONE HYDROCHLORIDE 5 MG/1
5 TABLET ORAL ONCE
Refills: 0 | Status: DISCONTINUED | OUTPATIENT
Start: 2022-10-19 | End: 2022-10-21

## 2022-10-19 RX ORDER — OXYTOCIN 10 UNIT/ML
VIAL (ML) INJECTION
Qty: 30 | Refills: 0 | Status: DISCONTINUED | OUTPATIENT
Start: 2022-10-19 | End: 2022-10-19

## 2022-10-19 RX ORDER — ONDANSETRON 8 MG/1
4 TABLET, FILM COATED ORAL ONCE
Refills: 0 | Status: COMPLETED | OUTPATIENT
Start: 2022-10-19 | End: 2022-10-19

## 2022-10-19 RX ORDER — SODIUM CHLORIDE 9 MG/ML
1000 INJECTION, SOLUTION INTRAVENOUS ONCE
Refills: 0 | Status: DISCONTINUED | OUTPATIENT
Start: 2022-10-19 | End: 2022-10-19

## 2022-10-19 RX ORDER — OXYTOCIN 10 UNIT/ML
VIAL (ML) INJECTION
Qty: 30 | Refills: 0 | Status: DISCONTINUED | OUTPATIENT
Start: 2022-10-19 | End: 2022-10-21

## 2022-10-19 RX ORDER — TETANUS TOXOID, REDUCED DIPHTHERIA TOXOID AND ACELLULAR PERTUSSIS VACCINE, ADSORBED 5; 2.5; 8; 8; 2.5 [IU]/.5ML; [IU]/.5ML; UG/.5ML; UG/.5ML; UG/.5ML
0.5 SUSPENSION INTRAMUSCULAR ONCE
Refills: 0 | Status: DISCONTINUED | OUTPATIENT
Start: 2022-10-19 | End: 2022-10-21

## 2022-10-19 RX ORDER — DIPHENHYDRAMINE HCL 50 MG
25 CAPSULE ORAL EVERY 6 HOURS
Refills: 0 | Status: DISCONTINUED | OUTPATIENT
Start: 2022-10-19 | End: 2022-10-21

## 2022-10-19 RX ORDER — QUETIAPINE FUMARATE 200 MG/1
50 TABLET, FILM COATED ORAL AT BEDTIME
Refills: 0 | Status: DISCONTINUED | OUTPATIENT
Start: 2022-10-19 | End: 2022-10-21

## 2022-10-19 RX ORDER — ONDANSETRON 8 MG/1
8 TABLET, FILM COATED ORAL ONCE
Refills: 0 | Status: COMPLETED | OUTPATIENT
Start: 2022-10-19 | End: 2022-10-19

## 2022-10-19 RX ORDER — SODIUM CHLORIDE 9 MG/ML
500 INJECTION, SOLUTION INTRAVENOUS ONCE
Refills: 0 | Status: DISCONTINUED | OUTPATIENT
Start: 2022-10-19 | End: 2022-10-19

## 2022-10-19 RX ORDER — OXYTOCIN 10 UNIT/ML
333.33 VIAL (ML) INJECTION
Qty: 20 | Refills: 0 | Status: DISCONTINUED | OUTPATIENT
Start: 2022-10-19 | End: 2022-10-21

## 2022-10-19 RX ORDER — LANOLIN
1 OINTMENT (GRAM) TOPICAL EVERY 6 HOURS
Refills: 0 | Status: DISCONTINUED | OUTPATIENT
Start: 2022-10-19 | End: 2022-10-21

## 2022-10-19 RX ORDER — BENZOCAINE 10 %
1 GEL (GRAM) MUCOUS MEMBRANE EVERY 6 HOURS
Refills: 0 | Status: DISCONTINUED | OUTPATIENT
Start: 2022-10-19 | End: 2022-10-21

## 2022-10-19 RX ORDER — SODIUM CHLORIDE 9 MG/ML
1000 INJECTION INTRAMUSCULAR; INTRAVENOUS; SUBCUTANEOUS
Refills: 0 | Status: DISCONTINUED | OUTPATIENT
Start: 2022-10-19 | End: 2022-10-21

## 2022-10-19 RX ORDER — AER TRAVELER 0.5 G/1
1 SOLUTION RECTAL; TOPICAL EVERY 4 HOURS
Refills: 0 | Status: DISCONTINUED | OUTPATIENT
Start: 2022-10-19 | End: 2022-10-21

## 2022-10-19 RX ORDER — PRAMOXINE HYDROCHLORIDE 150 MG/15G
1 AEROSOL, FOAM RECTAL EVERY 4 HOURS
Refills: 0 | Status: DISCONTINUED | OUTPATIENT
Start: 2022-10-19 | End: 2022-10-21

## 2022-10-19 RX ORDER — KETOROLAC TROMETHAMINE 30 MG/ML
30 SYRINGE (ML) INJECTION ONCE
Refills: 0 | Status: DISCONTINUED | OUTPATIENT
Start: 2022-10-19 | End: 2022-10-19

## 2022-10-19 RX ORDER — HYDROCORTISONE 1 %
1 OINTMENT (GRAM) TOPICAL EVERY 6 HOURS
Refills: 0 | Status: DISCONTINUED | OUTPATIENT
Start: 2022-10-19 | End: 2022-10-21

## 2022-10-19 RX ORDER — OXYCODONE HYDROCHLORIDE 5 MG/1
5 TABLET ORAL
Refills: 0 | Status: DISCONTINUED | OUTPATIENT
Start: 2022-10-19 | End: 2022-10-21

## 2022-10-19 RX ORDER — DIBUCAINE 1 %
1 OINTMENT (GRAM) RECTAL EVERY 6 HOURS
Refills: 0 | Status: DISCONTINUED | OUTPATIENT
Start: 2022-10-19 | End: 2022-10-21

## 2022-10-19 RX ORDER — IBUPROFEN 200 MG
600 TABLET ORAL EVERY 6 HOURS
Refills: 0 | Status: DISCONTINUED | OUTPATIENT
Start: 2022-10-19 | End: 2022-10-21

## 2022-10-19 RX ORDER — ACETAMINOPHEN 500 MG
975 TABLET ORAL
Refills: 0 | Status: DISCONTINUED | OUTPATIENT
Start: 2022-10-19 | End: 2022-10-21

## 2022-10-19 RX ORDER — SODIUM CHLORIDE 9 MG/ML
3 INJECTION INTRAMUSCULAR; INTRAVENOUS; SUBCUTANEOUS EVERY 8 HOURS
Refills: 0 | Status: DISCONTINUED | OUTPATIENT
Start: 2022-10-19 | End: 2022-10-21

## 2022-10-19 RX ORDER — IBUPROFEN 200 MG
600 TABLET ORAL EVERY 6 HOURS
Refills: 0 | Status: COMPLETED | OUTPATIENT
Start: 2022-10-19 | End: 2023-09-17

## 2022-10-19 RX ORDER — SIMETHICONE 80 MG/1
80 TABLET, CHEWABLE ORAL EVERY 4 HOURS
Refills: 0 | Status: DISCONTINUED | OUTPATIENT
Start: 2022-10-19 | End: 2022-10-21

## 2022-10-19 RX ORDER — MAGNESIUM HYDROXIDE 400 MG/1
30 TABLET, CHEWABLE ORAL
Refills: 0 | Status: DISCONTINUED | OUTPATIENT
Start: 2022-10-19 | End: 2022-10-21

## 2022-10-19 RX ADMIN — Medication 30 MILLIGRAM(S): at 09:37

## 2022-10-19 RX ADMIN — QUETIAPINE FUMARATE 50 MILLIGRAM(S): 200 TABLET, FILM COATED ORAL at 22:00

## 2022-10-19 RX ADMIN — Medication 975 MILLIGRAM(S): at 23:00

## 2022-10-19 RX ADMIN — SERTRALINE 50 MILLIGRAM(S): 25 TABLET, FILM COATED ORAL at 10:15

## 2022-10-19 RX ADMIN — Medication 600 MILLIGRAM(S): at 15:30

## 2022-10-19 RX ADMIN — Medication 30 MILLIGRAM(S): at 10:20

## 2022-10-19 RX ADMIN — Medication 975 MILLIGRAM(S): at 22:00

## 2022-10-19 RX ADMIN — ONDANSETRON 4 MILLIGRAM(S): 8 TABLET, FILM COATED ORAL at 04:22

## 2022-10-19 RX ADMIN — LEVETIRACETAM 1000 MILLIGRAM(S): 250 TABLET, FILM COATED ORAL at 03:50

## 2022-10-19 RX ADMIN — Medication 600 MILLIGRAM(S): at 14:56

## 2022-10-19 RX ADMIN — ONDANSETRON 8 MILLIGRAM(S): 8 TABLET, FILM COATED ORAL at 09:30

## 2022-10-19 RX ADMIN — LEVETIRACETAM 1000 MILLIGRAM(S): 250 TABLET, FILM COATED ORAL at 16:42

## 2022-10-19 RX ADMIN — CHLORHEXIDINE GLUCONATE 1 APPLICATION(S): 213 SOLUTION TOPICAL at 07:04

## 2022-10-19 NOTE — DISCHARGE NOTE OB - CARE PLAN
Principal Discharge DX:	 (normal spontaneous vaginal delivery)  Secondary Diagnosis:	Gestational hypertension w/o significant proteinuria in 3rd trimester   1 Principal Discharge DX:	 (normal spontaneous vaginal delivery)  Assessment and plan of treatment:	pelvic rest x 6 weeks  return 1 week for BP check  Secondary Diagnosis:	Gestational hypertension w/o significant proteinuria in 3rd trimester  Assessment and plan of treatment:	monitor home BPs. Call office if BP > or= 140/90. Return to hospital if BP > or = to 160/110

## 2022-10-19 NOTE — DISCHARGE NOTE OB - NS MD DC FALL RISK RISK
For information on Fall & Injury Prevention, visit: [email or web address] OR  [email or web address] OR  https://www.cdc.gov/steadi/patient.html

## 2022-10-19 NOTE — OB PROVIDER LABOR PROGRESS NOTE - NSVAGINALEXAM_OBGYN_ALL_OB_DT
19-Oct-2022 05:12
18-Oct-2022 23:37
18-Oct-2022 07:38
18-Oct-2022 13:40
19-Oct-2022 03:19
18-Oct-2022 19:40
19-Oct-2022 00:25
19-Oct-2022 00:49
19-Oct-2022 07:30

## 2022-10-19 NOTE — OB PROVIDER LABOR PROGRESS NOTE - NS_OBIHIFHRDETAILS_OBGYN_ALL_OB_FT
baseline 150, moderate variability, +accels 3 min prolonged decel
undetermined baseline at present
120  Moderate   Accels  -decels  Cat 1
120  Moderate  Accels  -decels  Cat 1
135  Moderate  Accels  -decels  Cat 1
baseline 120, 10 minute decel to the 100s with moderate variability
baseline 130, moderate variability, +accels, occasional late decels
120s, moderate variability, accels, recurrent variable decelerations
baseline 120, moderate variability, +accels, occasional variable
baseline 130, moderate variability, +accels, -decels

## 2022-10-19 NOTE — DISCHARGE NOTE OB - MEDICATION SUMMARY - MEDICATIONS TO STOP TAKING
I will STOP taking the medications listed below when I get home from the hospital:    birthcontrol  --

## 2022-10-19 NOTE — DISCHARGE NOTE OB - PLAN OF CARE
pelvic rest x 6 weeks  return 1 week for BP check monitor home BPs. Call office if BP > or= 140/90. Return to hospital if BP > or = to 160/110

## 2022-10-19 NOTE — OB PROVIDER LABOR PROGRESS NOTE - ASSESSMENT
28yo  @37w IOL for gHTN  - IUPC fell out, new IUPC placed  - 10 minute decel with moderate variability resolved with repositioning, oxygen, and pause of paul Dunham, PGY1  d/w Dr. Savage

## 2022-10-19 NOTE — OB PROVIDER LABOR PROGRESS NOTE - ASSESSMENT
- Pitocin paused  - IUPC placed without difficulty  - Restart pitocin after 20 minutes of improved FHT  - Consider amnioinfusion if variable decelerations do not improve  - c/w maternal resuscitation     Natacha Esteban, PGY2  Dr. Peña at bedside

## 2022-10-19 NOTE — OB RN DELIVERY SUMMARY - NSSELHIDDEN_OBGYN_ALL_OB_FT
[NS_DeliveryAttending1_OBGYN_ALL_OB_FT:[id number]],[NS_DeliveryAssist1_OBGYN_ALL_OB_FT:[id number]],[NS_DeliveryRN_OBGYN_ALL_OB_FT:[id number]],[NS_CirculateRN2_OBGYN_ALL_OB_FT:[id number]]

## 2022-10-19 NOTE — OB NEONATOLOGY/PEDIATRICIAN DELIVERY SUMMARY - NSMEDSBYPEDSA_OBGYN_ALL_OB
None Spironolactone Pregnancy And Lactation Text: This medication can cause feminization of the male fetus and should be avoided during pregnancy. The active metabolite is also found in breast milk.

## 2022-10-19 NOTE — DISCHARGE NOTE OB - CARE PROVIDER_API CALL
Avelina Manzanares (DO)  [facility]  [address] [place] [id number]  Phone: [phone]  Fax: [phone]  Follow Up Time:

## 2022-10-19 NOTE — DISCHARGE NOTE OB - PATIENT PORTAL LINK FT
You can access the FollowMyHealth Patient Portal offered by [facility] by registering at the following website: [email or web address]. By joining [facility]’s FollowMyHealth portal, you will also be able to view your health information using other applications (apps) compatible with our system.

## 2022-10-19 NOTE — CHART NOTE - NSCHARTNOTEFT_GEN_A_CORE
AN  Patient seen   Examined   FD +1  7#  pushing well  M [place] Problem: Infection - Central Venous Catheter-Associated Bloodstream Infection:  Goal: Will show no infection signs and symptoms  Will show no infection signs and symptoms   Outcome: Met This Shift

## 2022-10-19 NOTE — DISCHARGE NOTE OB - MEDICATION SUMMARY - MEDICATIONS TO TAKE
I will START or STAY ON the medications listed below when I get home from the hospital:    acetaminophen 325 mg oral tablet  -- 3 tab(s) by mouth every 8 hours  -- Indication: For  (normal spontaneous vaginal delivery)    ibuprofen 600 mg oral tablet  -- 1 tab(s) by mouth every 6 hours  -- Indication: For  (normal spontaneous vaginal delivery)    levETIRAcetam 500 mg oral tablet  -- 1 tab(s) by mouth 2 times a day  -- Indication: For seizure disorder    Zoloft 50 mg oral tablet  -- 1 tab(s) by mouth once a day  -- Indication: For depression    SEROquel 50 mg oral tablet  -- 1 tab(s) by mouth 2 times a day  -- Indication: For depression    Adderall 12.5 mg oral tablet  -- 1 tab(s) by mouth 2 times a day  -- Indication: For attention deficit disorder    Prenatal Multivitamins with Folic Acid 1 mg oral tablet  -- 1 tab(s) by mouth once a day  -- Indication: For  (normal spontaneous vaginal delivery)   I will START or STAY ON the medications listed below when I get home from the hospital:    ibuprofen 600 mg oral tablet  -- 1 tab(s) by mouth every 6 hours, As Needed  -- Indication: For depression    acetaminophen 325 mg oral tablet  -- 3 tab(s) by mouth every 8 hours, As Needed  -- Indication: For  (normal spontaneous vaginal delivery)    levETIRAcetam 1000 mg oral tablet  -- 1 tab(s) by mouth every 24 hours  -- Indication: For seizures    levETIRAcetam 500 mg oral tablet  -- 1 tab(s) by mouth every 24 hours  -- Indication: For seizures    sertraline 50 mg oral tablet  -- 1 tab(s) by mouth once a day  -- Indication: For depression    QUEtiapine 50 mg oral tablet  -- 1 tab(s) by mouth once a day (at bedtime)  -- Indication: For depression    Adderall 12.5 mg oral tablet  -- 1 tab(s) by mouth 2 times a day  -- Indication: For attention deficit disorder    Prenatal Multivitamins with Folic Acid 1 mg oral tablet  -- 1 tab(s) by mouth once a day  -- Indication: For  (normal spontaneous vaginal delivery)

## 2022-10-19 NOTE — OB PROVIDER LABOR PROGRESS NOTE - ASSESSMENT
26yo  @37 IOL for gHTN  - Continue pitocin  - Continue amnioinfusion    Allie Dunham, PGY1  d/w Dr. Savage

## 2022-10-19 NOTE — DISCHARGE NOTE OB - CALL YOUR HEALTHCARE PROVIDER IF YOU ARE EXPERIENCING SYMPTOMS OF DEPRESSION THAT LAST MORE THAN THREE DAYS
Occupational Therapy  1L NICU OT Treatment Note:   Visit Type: treatment  Precautions:  Medical precautions:  seizure precautions and fall risk; standard precautions.    Lines:     Basic: capped IV      Lines in chart and on patient reviewed, precautions maintained throughout session.  Hearing: no hearing deficits  Vision:     Current vision: wears glasses only for reading  Safety Measures: chair alarm    SUBJECTIVE  Patient agreed to participate in therapy this date.  KATLIN Newman approved OT session. Pt seated in recliner at end of session with alarm on and call light within reach. Pt stated, \"I have a second surgery scheduled in July.\"  Patient / Family Goal: return to previous functional status and return home    OBJECTIVE   Level of consciousness: alert    Oriented to appropriate for developmental age     Arousal alertness: appropriate responses to stimuli    Affect/Behavior: cooperative and pleasant  Patient activity tolerance: 2 to 1 activity to rest  Functional Communication/Cognition       Form of communication:  Verbal   Attention span:  Appears intact    Commands: follows all commands and directions consistently.    Transition between tasks: transitions tasks without difficulty.    Transfers:    Assistive devices: gait belt and 2-wheeled walker    Sit to stand: modified independent    Stand to sit: modified independent    Training completed:    Tasks: sit to stand and stand to sit  Functional Ambulation:    Assistance:modified independent and supervision   Assistive device:2-wheeled walker and gait belt    Distance (ft): 350;200    Details/Comments: Pt completed functional ambulation community distance of 350 feet with use of two-wheeled walker at modified independent level. Completed stair trial to complete 8 ascending/descending stairs at modified independent level with use of bilateral rails (completes this way at home). Completed second ambulation trial without assistive device at supervision level.  Slight slow pace noted, pt able to complete sudden-stop and head turning tasks without loss of balance. Pt reports ongoing balance difficulty d/t ankle. Discussed use of two-wheeled walker initially upon discharge and plans for OP coordinator follow up with pt.   Activities of Daily Living (ADLs):  Activities of daily living training:   Pt politely declined offered ADLs.       Interventions    Skilled input: verbal instruction/cues  Verbal Consent: Writer verbally educated and received verbal consent for hand placement, positioning of patient, and techniques to be performed today from patient for clothing adjustments for techniques, therapist position for techniques and hand placement and palpation for techniques as described above and how they are pertinent to the patient's plan of care.        ASSESSMENT  Impairments: balance  Functional Limitations: functional mobility, IADLs and community reintegration    Pt agreeable to therapy session. Focus of session on functional mobility, stair trial, and discussion of safety recommendations with pt and spouse in prep for likely discharge today. Pt overall at modified independent level for mobility with use of two-wheeled walker and supervision level for mobility without assistive device. Discussed use of two-wheeled walker initially upon discharge, OP coordinators to follow up with pt upon discharge to determine OP needs as pt feels near baseline during session. Pt will benefit from additional therapy services to maximize independence with I/ADLs and functional mobility.        Discharge Recommendations:  Recommendation for Discharge Location: OT WI: Home with Outpatient therapy (OP coordinators to follow up with pt upon d/c)  PT/OT Mobility Equipment for Discharge: To be determined. She has a 2WW at home  PT/OT ADL Equipment for Discharge: will monitor  OT Identified Barriers to Discharge: Medical  Progress: progressing toward goals    • Skilled therapy is required to  address these limitations in attempt to maximize the patient's independence.    Patient at End of Session:   Location: in chair  Safety measures: alarm system in place/re-engaged, call light within reach and lines intact  Handoff to: nurse    PLAN  Suggestions for next session as indicated: Item retrieval and transport, toilet transfer and task, monitor cog pending MoCA results    Frequency Comments: W, Th (1-2 more sessions if still in hospital) 6/28    Interventions: activity tolerance training, functional transfer training, patient education, therapeutic activity, safety training and caregiver training  Agreement to plan and goals: patient agrees with goals and treatment plan      GOALS  Long Term Goals: (to be met by time of discharge from hospital)  Grooming: Patient will complete grooming tasks in standing and at sink modified independent.  Status: progressing/ongoing  Upper body dressing: Patient will complete upper body dressing in sitting and at bedside modified independent.  Status: progressing/ongoing  Lower body dressing: Patient will complete lower body dressing in sitting and at bedside modified independent.  Status: progressing/ongoing  Toileting: Patient will complete toileting modified independent.  Status: progressing/ongoing  Bathing: Patient will complete bathingmodified independent  Status: progressing/ongoingToilet transfer: Patient will complete toilet transfer with modified independent. Status: progressing/ongoing  Walk in shower: Patient will complete walk in shower transfer with modified independent.  Status: progressing/ongoing  Home setting transfer: Patient will complete home setting transfers with modified independent.  Status: progressing/ongoing        Documented in the chart in the following areas: Pain. Assessment. Plan.      Therapy procedure time and total treatment time can be found documented on the Time Entry flowsheet   Statement Selected

## 2022-10-19 NOTE — OB PROVIDER LABOR PROGRESS NOTE - NS_SUBJECTIVE/OBJECTIVE_OBGYN_ALL_OB_FT
Pt checked for prolonged decel
Patient seen for CE in the setting of more contraction pain.
Pt checked for AROM
Patient was evaluated at bedside due patient's c/o of rectal pressure
Patient seen for CB placement and CE. Patient resting comfortably, no acute complaints, all questions answered.
Patient seen for routine CE in the setting of CB dislodged from vaginal canal.
Pt checked for increased rectal pressure
Pt checked for labor progress
Pt checked for rectal pressure
VE due to recurrent variable decelerations

## 2022-10-19 NOTE — OB RN DELIVERY SUMMARY - NS_SEPSISRSKCALC_OBGYN_ALL_OB_FT
EOS calculated successfully. EOS Risk Factor: 0.5/1000 live births ([facility] national incidence); GA=37w1d; Temp=98.8; ROM=8.8; GBS='Negative'; Antibiotics='No antibiotics or any antibiotics < 2 hrs prior to birth'

## 2022-10-20 LAB
ALBUMIN SERPL ELPH-MCNC: 3.1 G/DL — LOW (ref 3.3–5)
ALP SERPL-CCNC: 125 U/L — HIGH (ref 40–120)
ALT FLD-CCNC: 17 U/L — SIGNIFICANT CHANGE UP (ref 4–33)
ANION GAP SERPL CALC-SCNC: 10 MMOL/L — SIGNIFICANT CHANGE UP (ref 7–14)
APTT BLD: 26.8 SEC — LOW (ref 27–36.3)
AST SERPL-CCNC: 48 U/L — HIGH (ref 4–32)
BASOPHILS # BLD AUTO: 0.04 K/UL — SIGNIFICANT CHANGE UP (ref 0–0.2)
BASOPHILS NFR BLD AUTO: 0.4 % — SIGNIFICANT CHANGE UP (ref 0–2)
BILIRUB SERPL-MCNC: 0.2 MG/DL — SIGNIFICANT CHANGE UP (ref 0.2–1.2)
BUN SERPL-MCNC: 5 MG/DL — LOW (ref 7–23)
CALCIUM SERPL-MCNC: 8.8 MG/DL — SIGNIFICANT CHANGE UP (ref 8.4–10.5)
CHLORIDE SERPL-SCNC: 104 MMOL/L — SIGNIFICANT CHANGE UP (ref 98–107)
CO2 SERPL-SCNC: 24 MMOL/L — SIGNIFICANT CHANGE UP (ref 22–31)
CREAT SERPL-MCNC: 0.64 MG/DL — SIGNIFICANT CHANGE UP (ref 0.5–1.3)
EGFR: 123 ML/MIN/1.73M2 — SIGNIFICANT CHANGE UP
EOSINOPHIL # BLD AUTO: 0 K/UL — SIGNIFICANT CHANGE UP (ref 0–0.5)
EOSINOPHIL NFR BLD AUTO: 0 % — SIGNIFICANT CHANGE UP (ref 0–6)
FIBRINOGEN PPP-MCNC: 770 MG/DL — HIGH (ref 330–520)
GLUCOSE SERPL-MCNC: 84 MG/DL — SIGNIFICANT CHANGE UP (ref 70–99)
HCT VFR BLD CALC: 30.4 % — LOW (ref 34.5–45)
HGB BLD-MCNC: 10 G/DL — LOW (ref 11.5–15.5)
IANC: 9.11 K/UL — HIGH (ref 1.8–7.4)
IMM GRANULOCYTES NFR BLD AUTO: 0.8 % — SIGNIFICANT CHANGE UP (ref 0–0.9)
INR BLD: 0.99 RATIO — SIGNIFICANT CHANGE UP (ref 0.88–1.16)
LDH SERPL L TO P-CCNC: 273 U/L — HIGH (ref 135–225)
LYMPHOCYTES # BLD AUTO: 1.61 K/UL — SIGNIFICANT CHANGE UP (ref 1–3.3)
LYMPHOCYTES # BLD AUTO: 14.1 % — SIGNIFICANT CHANGE UP (ref 13–44)
MCHC RBC-ENTMCNC: 28.2 PG — SIGNIFICANT CHANGE UP (ref 27–34)
MCHC RBC-ENTMCNC: 32.9 GM/DL — SIGNIFICANT CHANGE UP (ref 32–36)
MCV RBC AUTO: 85.6 FL — SIGNIFICANT CHANGE UP (ref 80–100)
MONOCYTES # BLD AUTO: 0.56 K/UL — SIGNIFICANT CHANGE UP (ref 0–0.9)
MONOCYTES NFR BLD AUTO: 4.9 % — SIGNIFICANT CHANGE UP (ref 2–14)
NEUTROPHILS # BLD AUTO: 9.11 K/UL — HIGH (ref 1.8–7.4)
NEUTROPHILS NFR BLD AUTO: 79.8 % — HIGH (ref 43–77)
NRBC # BLD: 0 /100 WBCS — SIGNIFICANT CHANGE UP (ref 0–0)
NRBC # FLD: 0 K/UL — SIGNIFICANT CHANGE UP (ref 0–0)
PLATELET # BLD AUTO: 181 K/UL — SIGNIFICANT CHANGE UP (ref 150–400)
POTASSIUM SERPL-MCNC: 4.2 MMOL/L — SIGNIFICANT CHANGE UP (ref 3.5–5.3)
POTASSIUM SERPL-SCNC: 4.2 MMOL/L — SIGNIFICANT CHANGE UP (ref 3.5–5.3)
PROT SERPL-MCNC: 5.8 G/DL — LOW (ref 6–8.3)
PROTHROM AB SERPL-ACNC: 11.5 SEC — SIGNIFICANT CHANGE UP (ref 10.5–13.4)
RBC # BLD: 3.55 M/UL — LOW (ref 3.8–5.2)
RBC # FLD: 12.3 % — SIGNIFICANT CHANGE UP (ref 10.3–14.5)
SODIUM SERPL-SCNC: 138 MMOL/L — SIGNIFICANT CHANGE UP (ref 135–145)
URATE SERPL-MCNC: 5.1 MG/DL — SIGNIFICANT CHANGE UP (ref 2.5–7)
WBC # BLD: 11.41 K/UL — HIGH (ref 3.8–10.5)
WBC # FLD AUTO: 11.41 K/UL — HIGH (ref 3.8–10.5)

## 2022-10-20 RX ORDER — IBUPROFEN 200 MG
1 TABLET ORAL
Qty: 0 | Refills: 0 | DISCHARGE
Start: 2022-10-20

## 2022-10-20 RX ORDER — ACETAMINOPHEN 500 MG
3 TABLET ORAL
Qty: 0 | Refills: 0 | DISCHARGE
Start: 2022-10-20

## 2022-10-20 RX ADMIN — Medication 1 TABLET(S): at 12:58

## 2022-10-20 RX ADMIN — Medication 600 MILLIGRAM(S): at 18:40

## 2022-10-20 RX ADMIN — SODIUM CHLORIDE 3 MILLILITER(S): 9 INJECTION INTRAMUSCULAR; INTRAVENOUS; SUBCUTANEOUS at 06:31

## 2022-10-20 RX ADMIN — LEVETIRACETAM 1000 MILLIGRAM(S): 250 TABLET, FILM COATED ORAL at 03:58

## 2022-10-20 RX ADMIN — Medication 600 MILLIGRAM(S): at 05:55

## 2022-10-20 RX ADMIN — Medication 600 MILLIGRAM(S): at 11:05

## 2022-10-20 RX ADMIN — SERTRALINE 50 MILLIGRAM(S): 25 TABLET, FILM COATED ORAL at 09:04

## 2022-10-20 RX ADMIN — QUETIAPINE FUMARATE 50 MILLIGRAM(S): 200 TABLET, FILM COATED ORAL at 22:08

## 2022-10-20 RX ADMIN — Medication 975 MILLIGRAM(S): at 22:08

## 2022-10-20 RX ADMIN — Medication 600 MILLIGRAM(S): at 11:54

## 2022-10-20 RX ADMIN — Medication 600 MILLIGRAM(S): at 17:15

## 2022-10-20 RX ADMIN — Medication 600 MILLIGRAM(S): at 06:55

## 2022-10-20 RX ADMIN — Medication 975 MILLIGRAM(S): at 23:00

## 2022-10-20 NOTE — PROGRESS NOTE ADULT - SUBJECTIVE AND OBJECTIVE BOX
SUBJECTIVE:    Pain: Controlled    Complaints: None    MILESTONES:     Alert and oriented x 3  [x  ]    Out of bed /ambulating [  x]      Voiding [x  ]  Due to void [  ]    Tolerating a regular diet.    Infant feeding:   Breast [  ]   Bottle [  ]     Both [  ]                           Feeding related issues or concerns:    Objective   T(C): 36.7 (10-20-22 @ 05:30), Max: 37.1 (10-19-22 @ 17:50)  HR: 74 (10-20-22 @ 05:30) (58 - 86)  BP: 135/78 (10-20-22 @ 05:30) (124/69 - 162/82)  RR: 17 (10-20-22 @ 05:30) (17 - 18)  SpO2: 100% (10-20-22 @ 05:30) (79% - 100%)  Wt(kg): --                        10.0   11.41 )-----------( 181      ( 20 Oct 2022 06:15 )             30.4         Blood Type: O Positive    RPR: Negative          MEDICATIONS  (STANDING):  acetaminophen     Tablet .. 975 milliGRAM(s) Oral <User Schedule>  diphtheria/tetanus/pertussis (acellular) Vaccine (ADAcel) 0.5 milliLiter(s) IntraMuscular once  ibuprofen  Tablet. 600 milliGRAM(s) Oral every 6 hours  influenza   Vaccine 0.5 milliLiter(s) IntraMuscular once  levETIRAcetam 1000 milliGRAM(s) Oral two times a day  oxytocin Infusion 333.333 milliUNIT(s)/Min (1000 mL/Hr) IV Continuous <Continuous>  oxytocin Infusion 333.333 milliUNIT(s)/Min (1000 mL/Hr) IV Continuous <Continuous>  oxytocin Infusion.  milliUNIT(s)/Min (2 mL/Hr) IV Continuous <Continuous>  prenatal multivitamin 1 Tablet(s) Oral daily  QUEtiapine 50 milliGRAM(s) Oral at bedtime  sertraline 50 milliGRAM(s) Oral daily  sodium chloride 0.9% lock flush 3 milliLiter(s) IV Push every 8 hours  sodium chloride 0.9%. 1000 milliLiter(s) (180 mL/Hr) IV Continuous <Continuous>    MEDICATIONS  (PRN):  benzocaine 20%/menthol 0.5% Spray 1 Spray(s) Topical every 6 hours PRN for Perineal discomfort  dibucaine 1% Ointment 1 Application(s) Topical every 6 hours PRN Perineal discomfort  diphenhydrAMINE 25 milliGRAM(s) Oral every 6 hours PRN Pruritus  hydrocortisone 1% Cream 1 Application(s) Topical every 6 hours PRN Moderate Pain (4-6)  lanolin Ointment 1 Application(s) Topical every 6 hours PRN nipple soreness  magnesium hydroxide Suspension 30 milliLiter(s) Oral two times a day PRN Constipation  oxyCODONE    IR 5 milliGRAM(s) Oral every 3 hours PRN Moderate to Severe Pain (4-10)  oxyCODONE    IR 5 milliGRAM(s) Oral once PRN Moderate to Severe Pain (4-10)  pramoxine 1%/zinc 5% Cream 1 Application(s) Topical every 4 hours PRN Moderate Pain (4-6)  simethicone 80 milliGRAM(s) Chew every 4 hours PRN Gas  witch hazel Pads 1 Application(s) Topical every 4 hours PRN Perineal discomfort      ASSESSMENT:      27y      G 2   P  1011      PP Day # 1      Delivery:   [ X ]             [   ]          QBL - 235    Assisted delivery  :    Vacuum   [  ]   Forceps)  [  ]    Indication for assisted delivery: Tracing Abn [  ]     Maternal Exhaustion [  ]     Other [  ]    Past medical history significant for HPI:  28yo  @37w  p/f IOL for gHTN. –VB, -LOF, -Ctx, +FM. denies fever, chills, nausea, vomiting, diarrhea, headache, constipation, dizziness, syncope, chest pain, palpitations, shortness of breath, dysuria, urgency, frequency.  PNC: gHTN  GBS: neg  EFW: 6#12  ObHx:  - TOP D&C  GynHx: -c/f/STI, hx of HPV+  MedHx: epilepsy, last seizure   SrgHx: denies  PsychHx: anxiety  SocialHx: denies  AllergyHx: augmentin, PCN - rash  RxHx: keppra 1000 BID, zoloft 50, seroquel 50, adderall 12.5   (18 Oct 2022 05:43)      Current Issues:     Breasts: Soft [x  ]    Engorged [  ]    Nipples:    Abdomen: Soft [ x ]  Nontender [  ]  Nondistended [  ]   Distended [  ]    Vagina: Lochia   Mod [x  ]      Scant [  ]  Heavy  [  ]    Perineum:  Intact [  ]   Laceration   [  ]   Type of laceration [          ]    Episiotomy [ X ]    Type of episiotomy  [    LML    ]    Lower extremities: Edema  [  ]    Nontender -  Sukhwinder  [  ]  Negative Bulmaro's sign [  ] Positive pedal pulses [  ]    Other relevant physical exam findings;      PLAN:    Plan: Increase ambulation, analgesia PRN and pain medication protocol standing oxycodone, ibuprofen and acetaminophen.    Diet: Continue regular diet    Labs:    Continue routine postpartum care.  This patient has GHTN.  She has a Blood Pressure Monitor at home as she had been monitoring her B/P's. Reviewed the criteria for monitoring and reporting  her B/P's and the patient verbalized understanding of the information given.  For  Consult for a Hx of Anxiety.    Discharge Planning [x  ]    For  Discharge Today  [  X ]    Consults :  Social Work [ X ]     Lactation [x  ]    Other [      ] SUBJECTIVE:    Pain: Controlled    Complaints: None    MILESTONES:     Alert and oriented x 3  [x  ]    Out of bed /ambulating [  x]      Voiding [x  ]  Due to void [  ]    Tolerating a regular diet.    Infant feeding:   Breast [  ]   Bottle [  ]     Both [  ]                           Feeding related issues or concerns:    Objective   T(C): 36.7 (10-20-22 @ 05:30), Max: 37.1 (10-19-22 @ 17:50)  HR: 74 (10-20-22 @ 05:30) (58 - 86)  BP: 135/78 (10-20-22 @ 05:30) (124/69 - 162/82)  RR: 17 (10-20-22 @ 05:30) (17 - 18)  SpO2: 100% (10-20-22 @ 05:30) (79% - 100%)  Wt(kg): --                        10.0   11.41 )-----------( 181      ( 20 Oct 2022 06:15 )             30.4         Blood Type: O Positive    RPR: Negative          MEDICATIONS  (STANDING):  acetaminophen     Tablet .. 975 milliGRAM(s) Oral <User Schedule>  diphtheria/tetanus/pertussis (acellular) Vaccine (ADAcel) 0.5 milliLiter(s) IntraMuscular once  ibuprofen  Tablet. 600 milliGRAM(s) Oral every 6 hours  influenza   Vaccine 0.5 milliLiter(s) IntraMuscular once  levETIRAcetam 1000 milliGRAM(s) Oral two times a day  oxytocin Infusion 333.333 milliUNIT(s)/Min (1000 mL/Hr) IV Continuous <Continuous>  oxytocin Infusion 333.333 milliUNIT(s)/Min (1000 mL/Hr) IV Continuous <Continuous>  oxytocin Infusion.  milliUNIT(s)/Min (2 mL/Hr) IV Continuous <Continuous>  prenatal multivitamin 1 Tablet(s) Oral daily  QUEtiapine 50 milliGRAM(s) Oral at bedtime  sertraline 50 milliGRAM(s) Oral daily  sodium chloride 0.9% lock flush 3 milliLiter(s) IV Push every 8 hours  sodium chloride 0.9%. 1000 milliLiter(s) (180 mL/Hr) IV Continuous <Continuous>    MEDICATIONS  (PRN):  benzocaine 20%/menthol 0.5% Spray 1 Spray(s) Topical every 6 hours PRN for Perineal discomfort  dibucaine 1% Ointment 1 Application(s) Topical every 6 hours PRN Perineal discomfort  diphenhydrAMINE 25 milliGRAM(s) Oral every 6 hours PRN Pruritus  hydrocortisone 1% Cream 1 Application(s) Topical every 6 hours PRN Moderate Pain (4-6)  lanolin Ointment 1 Application(s) Topical every 6 hours PRN nipple soreness  magnesium hydroxide Suspension 30 milliLiter(s) Oral two times a day PRN Constipation  oxyCODONE    IR 5 milliGRAM(s) Oral every 3 hours PRN Moderate to Severe Pain (4-10)  oxyCODONE    IR 5 milliGRAM(s) Oral once PRN Moderate to Severe Pain (4-10)  pramoxine 1%/zinc 5% Cream 1 Application(s) Topical every 4 hours PRN Moderate Pain (4-6)  simethicone 80 milliGRAM(s) Chew every 4 hours PRN Gas  witch hazel Pads 1 Application(s) Topical every 4 hours PRN Perineal discomfort      ASSESSMENT:      27y      G 2   P  1011      PP Day # 1      Delivery:   [ X ]             [   ]          QBL - 235    Assisted delivery  :    Vacuum   [  ]   Forceps)  [  ]    Indication for assisted delivery: Tracing Abn [  ]     Maternal Exhaustion [  ]     Other [  ]    Past medical history significant for HPI:  26yo  @37w  p/f IOL for gHTN. –VB, -LOF, -Ctx, +FM. denies fever, chills, nausea, vomiting, diarrhea, headache, constipation, dizziness, syncope, chest pain, palpitations, shortness of breath, dysuria, urgency, frequency.  PNC: gHTN  GBS: neg  EFW: 6#12  ObHx:  - TOP D&C  GynHx: -c/f/STI, hx of HPV+  MedHx: epilepsy, last seizure   SrgHx: denies  PsychHx: anxiety  SocialHx: denies  AllergyHx: augmentin, PCN - rash  RxHx: keppra 1000 BID, zoloft 50, seroquel 50, adderall 12.5   (18 Oct 2022 05:43)      Current Issues:     Breasts: Soft [x  ]    Engorged [  ]    Nipples:    Abdomen: Soft [ x ]  Nontender [  ]  Nondistended [  ]   Distended [  ]    Vagina: Lochia   Mod [x  ]      Scant [  ]  Heavy  [  ]    Perineum:  Intact [  ]   Laceration   [  ]   Type of laceration [          ]    Episiotomy [ X ]    Type of episiotomy  [    LML    ]    Lower extremities: Edema  [  ]    Nontender -  Sukhwinder  [  ]  Negative Bulmaro's sign [  ] Positive pedal pulses [  ]    Other relevant physical exam findings;      PLAN:    Plan: Increase ambulation, analgesia PRN and pain medication protocol standing oxycodone, ibuprofen and acetaminophen.    Diet: Continue regular diet    Labs:    Continue routine postpartum care.  This patient has GHTN.  She has a Blood Pressure Monitor at home as she had been monitoring her B/P's. Reviewed the criteria for monitoring and reporting  her B/P's and the patient verbalized understanding of the information given.  For  Consult for a Hx of Anxiety.    Discharge Planning [x  ]    For  Discharge Today  [  X ]    Consults :  Social Work [ X ]     Lactation [x  ]    Other [      ]    Attending: Pt seen and examined. Baby returned from NICU and then had rapid called. Advised patient to stay in hospital and will dc in am 10/21  -S Morris QUICK

## 2022-10-21 ENCOUNTER — APPOINTMENT (OUTPATIENT)
Dept: OBGYN | Facility: CLINIC | Age: 27
End: 2022-10-21

## 2022-10-21 VITALS
DIASTOLIC BLOOD PRESSURE: 81 MMHG | HEART RATE: 80 BPM | TEMPERATURE: 99 F | SYSTOLIC BLOOD PRESSURE: 132 MMHG | RESPIRATION RATE: 18 BRPM | OXYGEN SATURATION: 100 %

## 2022-10-21 RX ORDER — SERTRALINE 25 MG/1
1 TABLET, FILM COATED ORAL
Qty: 0 | Refills: 0 | DISCHARGE

## 2022-10-21 RX ORDER — SERTRALINE 25 MG/1
1 TABLET, FILM COATED ORAL
Qty: 0 | Refills: 0 | DISCHARGE
Start: 2022-10-21

## 2022-10-21 RX ORDER — LEVETIRACETAM 250 MG/1
500 TABLET, FILM COATED ORAL EVERY 24 HOURS
Refills: 0 | Status: DISCONTINUED | OUTPATIENT
Start: 2022-10-21 | End: 2022-10-21

## 2022-10-21 RX ORDER — LEVETIRACETAM 250 MG/1
1 TABLET, FILM COATED ORAL
Qty: 0 | Refills: 0 | DISCHARGE
Start: 2022-10-21

## 2022-10-21 RX ORDER — DEXTROAMPHETAMINE SACCHARATE, AMPHETAMINE ASPARTATE, DEXTROAMPHETAMINE SULFATE AND AMPHETAMINE SULFATE 1.875; 1.875; 1.875; 1.875 MG/1; MG/1; MG/1; MG/1
12.5 TABLET ORAL DAILY
Refills: 0 | Status: DISCONTINUED | OUTPATIENT
Start: 2022-10-21 | End: 2022-10-21

## 2022-10-21 RX ORDER — QUETIAPINE FUMARATE 200 MG/1
1 TABLET, FILM COATED ORAL
Qty: 0 | Refills: 0 | DISCHARGE

## 2022-10-21 RX ORDER — LEVETIRACETAM 250 MG/1
1000 TABLET, FILM COATED ORAL EVERY 24 HOURS
Refills: 0 | Status: DISCONTINUED | OUTPATIENT
Start: 2022-10-21 | End: 2022-10-21

## 2022-10-21 RX ORDER — QUETIAPINE FUMARATE 200 MG/1
1 TABLET, FILM COATED ORAL
Qty: 0 | Refills: 0 | DISCHARGE
Start: 2022-10-21

## 2022-10-21 RX ADMIN — Medication 600 MILLIGRAM(S): at 04:12

## 2022-10-21 RX ADMIN — Medication 600 MILLIGRAM(S): at 05:00

## 2022-10-21 RX ADMIN — Medication 600 MILLIGRAM(S): at 13:03

## 2022-10-21 RX ADMIN — Medication 600 MILLIGRAM(S): at 12:11

## 2022-10-21 RX ADMIN — LEVETIRACETAM 500 MILLIGRAM(S): 250 TABLET, FILM COATED ORAL at 04:12

## 2022-10-21 RX ADMIN — SERTRALINE 50 MILLIGRAM(S): 25 TABLET, FILM COATED ORAL at 12:11

## 2022-10-21 RX ADMIN — Medication 1 TABLET(S): at 12:11

## 2022-10-21 NOTE — CHART NOTE - NSCHARTNOTEFT_GEN_A_CORE
Patient spoke with neurologist via telephone. Neurologist recommended updating Keppra dose to 500mg in the AM and 1000mg in the PM. Neurologist has also previously discussed that she can restart Adderall.     Allie Dunham, PGY1  d/w Dr. Caceres

## 2022-10-21 NOTE — PROGRESS NOTE ADULT - SUBJECTIVE AND OBJECTIVE BOX
CATRACHITO COMER   MRN# [id number]    SUBJECTIVE:    Pain: Controlled  Complaints: None    MILESTONES:   Alert and oriented x 3  [X  ]Out of bed /ambulating [ X ]  Voiding [X  ]  Due to void [  ]  Tolerating a regular diet [ X  ]  Infant feeding:   Breast [  ]   Bottle [ X ]     Both [  ]                         Feeding related issues or concerns:    Objective   T(C): 36.7 (10-21-22 @ 10:15), Max: 36.9 (10-21-22 @ 00:34)  HR: 82 (10-21-22 @ 10:15) (77 - 88)  BP: 134/87 (10-21-22 @ 10:15) (125/75 - 142/83)  RR: 17 (10-21-22 @ 10:15) (17 - 18)  SpO2: 100% (10-21-22 @ 10:15) (98% - 100%)  Wt(kg): --    LABS:                          10.0   11.41 )-----------( 181      ( 20 Oct 2022 06:15 )             30.4                         11.0   16.35 )-----------( 160      ( 19 Oct 2022 10:01 )             33.0                         10.1   9.82  )-----------( 143      ( 18 Oct 2022 21:35 )             30.6                         10.3   9.67  )-----------( 177      ( 18 Oct 2022 06:30 )             30.8       Blood Type: O Positive      Treponema Pallidum Antibody Interpretation: Negative (10-18 @ 06:30)             ASSESSMENT:     26 y/o        G2  P 1011    PP Day #  2     Delivery:   [X ]      QBL - 235         GHTN    Assisted delivery  :    Vacuum   [  ]   Forceps)  [  ]    Indication for assisted delivery: Tracing Abn [  ]     Maternal Exhaustion [  ]     Other [  ]    PAST MEDICAL & SURGICAL HISTORY:  Seizure      No significant past surgical history          Current Issues:     Breasts: Soft [ X ]    Engorged [  ]    Abdomen: Soft [ X ]  Nontender [X  ]  Nondistended [  ]   Distended [  ]                       Fundus firm [X  ]    Fundus boggy [   ]   Bowel sounds present [  ]     Bowel sounds absent  [  ]     Vagina: Lochia  Mod [  ]      Scant [  ]  Heavy [  ]    Perineum:  Intact [  ]   Laceration   [  ]   Type of laceration [   ]     Episiotomy [ X ]    Type of episiotomy [ LML  ]    Lower extremities: Edema[  ]    Noted bilaterally [  ]       Non-tender calves [  ]      Negative Bulmaro's sign [  ]    Positive pedal pulses [  ]    Other relevant physical exam findings:      PLAN:    Plan: Increase ambulation, analgesia PRN and pain medication  protocol standing oxycodone, ibuprofen and acetaminophen.    Diet: Continue regular diet      Continue routine postpartum care.  S/P SW Consult for a Hx of Anxiety (on meds).  This patient has GHTN. She has a B/P Monitor at home. Reviewed and reinforced the education re monitoring and reporting her B/P's and the patient verbalized understanding of the information reviewed.    Discharge Planning [ X ]         Discharge Home Today [  X  ]    Consults :  Social Work [ X ]     Lactation [  ]    Other [   ]

## 2022-10-24 ENCOUNTER — NON-APPOINTMENT (OUTPATIENT)
Age: 27
End: 2022-10-24

## 2022-10-24 ENCOUNTER — TRANSCRIPTION ENCOUNTER (OUTPATIENT)
Age: 27
End: 2022-10-24

## 2022-10-28 ENCOUNTER — APPOINTMENT (OUTPATIENT)
Dept: OBGYN | Facility: CLINIC | Age: 27
End: 2022-10-28

## 2022-11-04 ENCOUNTER — APPOINTMENT (OUTPATIENT)
Dept: OBGYN | Facility: CLINIC | Age: 27
End: 2022-11-04

## 2022-11-11 ENCOUNTER — APPOINTMENT (OUTPATIENT)
Dept: OBGYN | Facility: CLINIC | Age: 27
End: 2022-11-11

## 2022-11-17 ENCOUNTER — NON-APPOINTMENT (OUTPATIENT)
Age: 27
End: 2022-11-17

## 2022-11-29 ENCOUNTER — APPOINTMENT (OUTPATIENT)
Dept: OBGYN | Facility: CLINIC | Age: 27
End: 2022-11-29

## 2022-11-29 VITALS
HEIGHT: 68 IN | WEIGHT: 165 LBS | DIASTOLIC BLOOD PRESSURE: 78 MMHG | SYSTOLIC BLOOD PRESSURE: 106 MMHG | BODY MASS INDEX: 25.01 KG/M2

## 2022-11-29 PROCEDURE — 0503F POSTPARTUM CARE VISIT: CPT

## 2022-11-30 NOTE — HISTORY OF PRESENT ILLNESS
[Postpartum Follow Up] : postpartum follow up [Delivery Date: ___] : on [unfilled] [] : delivered by vaginal delivery [Male] : Delivery History: baby boy [Doing Well] : is doing well [Healed] : healed [Back to Normal] : is back to normal in size [None] : no vaginal bleeding [Normal] : the vagina was normal [Healing Well] : is healing well [Breastfeeding] : not currently nursing [Abdominal Pain] : no abdominal pain [Back Pain] : no back pain [BreastFeeding Problems] : no breastfeeding problems [Chest Pain] : no chest pain [Heavy Bleeding] : no heavy bleeding [Irregular Bleeding] : no irregular bleeding [Cervix Sample Taken] : cervical sample not taken for a Pap smear [FreeTextEntry8] : 6 wk PP visit [FreeTextEntry9] : SHRUTIN resolved [de-identified] : EDS 0 [de-identified] : cleared for physical exercise and sexual intercourse

## 2023-01-17 ENCOUNTER — TRANSCRIPTION ENCOUNTER (OUTPATIENT)
Age: 28
End: 2023-01-17

## 2023-02-09 ENCOUNTER — APPOINTMENT (OUTPATIENT)
Dept: NEUROLOGY | Facility: CLINIC | Age: 28
End: 2023-02-09

## 2023-02-14 ENCOUNTER — APPOINTMENT (OUTPATIENT)
Dept: OBGYN | Facility: CLINIC | Age: 28
End: 2023-02-14

## 2023-02-14 ENCOUNTER — TRANSCRIPTION ENCOUNTER (OUTPATIENT)
Age: 28
End: 2023-02-14

## 2023-03-09 ENCOUNTER — APPOINTMENT (OUTPATIENT)
Dept: NEUROLOGY | Facility: CLINIC | Age: 28
End: 2023-03-09

## 2023-03-16 ENCOUNTER — TRANSCRIPTION ENCOUNTER (OUTPATIENT)
Age: 28
End: 2023-03-16

## 2023-03-22 ENCOUNTER — APPOINTMENT (OUTPATIENT)
Dept: NEUROLOGY | Facility: CLINIC | Age: 28
End: 2023-03-22

## 2023-04-01 ENCOUNTER — APPOINTMENT (OUTPATIENT)
Dept: INTERNAL MEDICINE | Facility: CLINIC | Age: 28
End: 2023-04-01

## 2023-04-03 ENCOUNTER — TRANSCRIPTION ENCOUNTER (OUTPATIENT)
Age: 28
End: 2023-04-03

## 2023-04-04 ENCOUNTER — TRANSCRIPTION ENCOUNTER (OUTPATIENT)
Age: 28
End: 2023-04-04

## 2023-04-05 ENCOUNTER — APPOINTMENT (OUTPATIENT)
Dept: OBGYN | Facility: CLINIC | Age: 28
End: 2023-04-05
Payer: MEDICAID

## 2023-04-05 VITALS
SYSTOLIC BLOOD PRESSURE: 110 MMHG | BODY MASS INDEX: 18.19 KG/M2 | HEIGHT: 68 IN | WEIGHT: 120 LBS | DIASTOLIC BLOOD PRESSURE: 70 MMHG

## 2023-04-05 DIAGNOSIS — R63.4 ABNORMAL WEIGHT LOSS: ICD-10-CM

## 2023-04-05 DIAGNOSIS — N63.0 UNSPECIFIED LUMP IN UNSPECIFIED BREAST: ICD-10-CM

## 2023-04-05 DIAGNOSIS — Z01.419 ENCOUNTER FOR GYNECOLOGICAL EXAMINATION (GENERAL) (ROUTINE) W/OUT ABNORMAL FINDINGS: ICD-10-CM

## 2023-04-05 DIAGNOSIS — N63.20 UNSPECIFIED LUMP IN THE LEFT BREAST, UNSPECIFIED QUADRANT: ICD-10-CM

## 2023-04-05 PROCEDURE — 99395 PREV VISIT EST AGE 18-39: CPT

## 2023-04-05 PROCEDURE — 99213 OFFICE O/P EST LOW 20 MIN: CPT | Mod: 25

## 2023-04-05 NOTE — PLAN
[FreeTextEntry1] : Annual: exam and orders as above, pap done\par \par unintentional weight loss: TSH, CBC, CMP ordered, advised pt to follow with PCP\par  \par Breast nodule: exam wnl,breast US ordered as patient self detected nodule  and strong fam h/o breast ca\par \par GYN counseling: Patient instructed to call for Unusual Pelvic pain,  UTI symptoms, irregular vaginal bleeding/discharge- Patient verbalized agreement\par \par F/U: patient to follow up in one year for annual GYN exam unless otherwise needed\par \par \par

## 2023-04-05 NOTE — HISTORY OF PRESENT ILLNESS
[FreeTextEntry1] : 27yo  female LMP 3/23/23  presents for annual GYN visit\par Patient denies any GYN complaints \par \par States she has been losing weight inspite of eating 4 meals a day. patient not breast feeding statethyroid disease runs in family\par \par patient reports left breast lump noticed it \par \par Menstrual Cycle:monthly\par Sexual Activity:one male partner\par Contraception: condoms\par \par ROS: Denies fever/chills, HA, Cough/sore throat, CP, SOB, N/V, Diarrhea/Constipation, Pelvic pain, Urinary frequency/ urgency/ Incontinence, irregular vaginal bleeding, discharge or irritation\par \par New Medica Dx.or Sx since last visit:none\par PMH: neurology epilepsy on Kepra\par meds: kepra, olanzapine, and Aderall\par \par Preventative\par PCP:yearly\par Physical Activity:yes\par Diet:healthy\par \par

## 2023-04-06 ENCOUNTER — APPOINTMENT (OUTPATIENT)
Dept: INTERNAL MEDICINE | Facility: CLINIC | Age: 28
End: 2023-04-06

## 2023-04-06 LAB
ALBUMIN SERPL ELPH-MCNC: 4.9 G/DL
ALP BLD-CCNC: 68 U/L
ALT SERPL-CCNC: 14 U/L
ANION GAP SERPL CALC-SCNC: 10 MMOL/L
AST SERPL-CCNC: 16 U/L
BASOPHILS # BLD AUTO: 0.06 K/UL
BASOPHILS NFR BLD AUTO: 0.8 %
BILIRUB SERPL-MCNC: 0.2 MG/DL
BUN SERPL-MCNC: 19 MG/DL
CALCIUM SERPL-MCNC: 9.7 MG/DL
CHLORIDE SERPL-SCNC: 102 MMOL/L
CO2 SERPL-SCNC: 28 MMOL/L
CREAT SERPL-MCNC: 0.72 MG/DL
EGFR: 116 ML/MIN/1.73M2
EOSINOPHIL # BLD AUTO: 0.1 K/UL
EOSINOPHIL NFR BLD AUTO: 1.3 %
GLUCOSE SERPL-MCNC: 79 MG/DL
HCT VFR BLD CALC: 39.8 %
HGB BLD-MCNC: 12.8 G/DL
HPV HIGH+LOW RISK DNA PNL CVX: NOT DETECTED
IMM GRANULOCYTES NFR BLD AUTO: 0.3 %
LYMPHOCYTES # BLD AUTO: 2.77 K/UL
LYMPHOCYTES NFR BLD AUTO: 36.8 %
MAN DIFF?: NORMAL
MCHC RBC-ENTMCNC: 29.6 PG
MCHC RBC-ENTMCNC: 32.2 GM/DL
MCV RBC AUTO: 92.1 FL
MONOCYTES # BLD AUTO: 0.43 K/UL
MONOCYTES NFR BLD AUTO: 5.7 %
NEUTROPHILS # BLD AUTO: 4.14 K/UL
NEUTROPHILS NFR BLD AUTO: 55.1 %
PLATELET # BLD AUTO: 267 K/UL
POTASSIUM SERPL-SCNC: 4.4 MMOL/L
PROT SERPL-MCNC: 7.1 G/DL
RBC # BLD: 4.32 M/UL
RBC # FLD: 11.9 %
SODIUM SERPL-SCNC: 139 MMOL/L
TSH SERPL-ACNC: 1.07 UIU/ML
WBC # FLD AUTO: 7.52 K/UL

## 2023-04-07 ENCOUNTER — TRANSCRIPTION ENCOUNTER (OUTPATIENT)
Age: 28
End: 2023-04-07

## 2023-04-07 ENCOUNTER — APPOINTMENT (OUTPATIENT)
Dept: NEUROLOGY | Facility: CLINIC | Age: 28
End: 2023-04-07
Payer: MEDICAID

## 2023-04-07 ENCOUNTER — NON-APPOINTMENT (OUTPATIENT)
Age: 28
End: 2023-04-07

## 2023-04-07 PROCEDURE — 99202 OFFICE O/P NEW SF 15 MIN: CPT | Mod: 95

## 2023-04-07 PROCEDURE — 99212 OFFICE O/P EST SF 10 MIN: CPT | Mod: 95

## 2023-04-10 ENCOUNTER — APPOINTMENT (OUTPATIENT)
Dept: INTERNAL MEDICINE | Facility: CLINIC | Age: 28
End: 2023-04-10

## 2023-04-10 ENCOUNTER — TRANSCRIPTION ENCOUNTER (OUTPATIENT)
Age: 28
End: 2023-04-10

## 2023-04-10 NOTE — HISTORY OF PRESENT ILLNESS
[Febrile Seizures] : febrile seizures [FreeTextEntry1] : ***UPDATE:4/7/2023***\par Appointment was conducted by audiovisual/telehealth at request of patient in place of a follow up office visit due to heightened concern for Corona virus infection risk.\par Verbal consent given on Apr 07, 2023 2:05 PM by the patient Ms. CATRACHITO COMER Jul 23 1995 who understands that the telephone visit will be charged to insurance and may involve co-pay for patient\par Nurse Practitioner location:\par Patient location:\par Individuals on call: MARRY PedroMATTHEW, Ms. CATRACHITO COMER\par \par \par \par ***UPDATE:10/17/2022***\par Appointment was conducted by audiovisual/telehealth at request of patient in place of a follow up office visit due to heightened concern for Corona virus infection risk.\par Verbal consent given on Oct 18, 2022 3:04 PM by the patient Ms. CATRACHITO COMER Jul 23 1995 who understands that the telephone visit will be charged to insurance and may involve co-pay for patient\par Nurse Practitioner location:office\par Patient location:home\par Individuals on call: DARIUS Pedro, Ms. CATRACHITO COMER\par \par ~ 37 weeks pregnant  scheduled to be induced tomorrow\par No reported interval seizures\par \par \par \par Appointment was conducted by audiovisual/telehealth at request of patient in place of a follow up office visit due to heightened concern for Corona virus infection risk.\par Verbal consent given on Aug 25, 2022 11:12 AM by the patient Ms. CATRACHITO COMER Jul 23 1995 who understands that the telephone visit will be charged to insurance and may involve co-pay for patient\par Nurse Practitioner location:office\par Patient location:home\par Individuals on call: DARIUS Pedro, Ms. CATRACHITO COMER\par \par No reported interval seizures now 29 weeks pregnant  \par \par Levetiracetam 1000 mg  BID\par 7/26 Levetiracetam level 6.5\par \par \par \par \par ***UPDATE:5/26/2022***\par Appointment was conducted by audiovisual/telehealth at request of patient in place of a follow up office visit due to heightened concern for Corona virus infection risk.\par Verbal consent given on May 26, 2022 11:31 AM by the patient Ms. CATRACHITO COMER Jul 23 1995 who understands that the telephone visit will be charged to insurance and may involve co-pay for patient\par Nurse Practitioner location:office\par Patient location:home\par Individuals on call: Jocelyne CookDARIUS, Ms. CATRACHITO COMER\par \par Ms Catrachito Comer is now 16 weeks pregnant.\par She is doing well with no reported interval seizures\par \par levetiracetam 500 mg tabs 1.5 tabs po BID\par \par \par \par ***UPDATE:4/1/2022***\par Appointment was conducted by audiovisual/telehealth at request of patient in place of a follow up office visit due to heightened concern for Corona virus infection risk.\par Verbal consent given on Apr 04, 2022 6:47 AM by the patient Ms. CATRACHITO COMER Jul 23 1995 who understands that the telephone visit will be charged to insurance and may involve co-pay for patient\par Nurse Practitioner location:office\par Patient location:home\par Individuals on call: Jocelyne Cook MARRYMATTHEW, Ms. CATRACHITO COMER\par \par Ms Catrachito Comer Is doing well with no interval seizures. She is currently 8 weeks pregnant.\par \par \par Levetiracetam 500 mg BID\par \par ***UPDATE: 1/26/2022***\par Appointment was conducted by audiovisual/telehealth at request of patient in place of a follow up office visit due to heightened concern for Corona virus infection risk.\par Verbal consent given on Jan 26, 2022 2:26 PM by the patient Ms. CATRACHITO COMER Jul 23 1995 who understands that the telephone visit will be charged to insurance and may involve co-pay for patient\par Nurse Practitioner location:office\par Patient location:home\par Individuals on call: Jocelyne Cook MARRYMATTHEW, Ms. CATRACHITO COMER\par \par Ms Catrachito Comer remains seizure free and doing well\par She is starting a 2 year [employer] program at [employer]\par Requesting DMV forms to be completed\par \par She is now off Escitalopram since last weekend and started on Symbyax. She is followed by a psychiatrist and therapist and doing well\par \par \par \par 8/30/2021\par Ms Catrachito Comer is doing well with no recent documented seizures. Her last seizure was in the setting of Xanax withdrawal in Feb 2015\par \par Levetiracetam 500mg BID\par \par Hx\par The patient is 26year-old right handed woman with seizures starting at age 1, with generalized tonic clonic seizures.  She was placed on medication at age 1, and at some point was diagnosed with petit mal around age 3, with abnormal EEG.  She started on Zarontin but was allergic.  She was tried on Dilantin, phenobarbital, neurontin, Topamax and none worked well and there were behavioral side effects.\par \par She was stable on lamictal by age 16 to the point where it was tapered last year at age 18 since she was doing well.  No EEGs were performed.  She had an MRI at [facility] but magdaleno not have result, from 2014.\par \par She had one GTC at a club with strobe lights while on Lamictal in 2012.  She feels jumpy when she looks at strobe lights.  \par \par It is unclear if she is continuing to have petit mal spells.  Her most recent convulsion was  in the setting of Xanax withdrawal and it was severe with three repetitive seizures..  In November 2014, while using drugs, but not withdrawing, she had a witnessed seizure at work and she had soft tissue injuries.   \par Last seizure in Feb 2015 \par \par She feels irritable on Keppra after this was started in November 2014, and would like to consider going back to Lamictal since she states her mood was better.\par \par She was born with umbilical cord around her neck, and she had some special education classes.  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

## 2023-04-12 LAB — CYTOLOGY CVX/VAG DOC THIN PREP: NORMAL

## 2023-04-14 ENCOUNTER — TRANSCRIPTION ENCOUNTER (OUTPATIENT)
Age: 28
End: 2023-04-14

## 2023-04-15 ENCOUNTER — APPOINTMENT (OUTPATIENT)
Dept: INTERNAL MEDICINE | Facility: CLINIC | Age: 28
End: 2023-04-15

## 2023-04-17 ENCOUNTER — TRANSCRIPTION ENCOUNTER (OUTPATIENT)
Age: 28
End: 2023-04-17

## 2023-04-19 ENCOUNTER — APPOINTMENT (OUTPATIENT)
Dept: INTERNAL MEDICINE | Facility: CLINIC | Age: 28
End: 2023-04-19

## 2023-04-20 ENCOUNTER — APPOINTMENT (OUTPATIENT)
Dept: INTERNAL MEDICINE | Facility: CLINIC | Age: 28
End: 2023-04-20

## 2023-04-24 ENCOUNTER — RESULT REVIEW (OUTPATIENT)
Age: 28
End: 2023-04-24

## 2023-04-24 ENCOUNTER — APPOINTMENT (OUTPATIENT)
Dept: MAMMOGRAPHY | Facility: CLINIC | Age: 28
End: 2023-04-24
Payer: MEDICAID

## 2023-04-24 ENCOUNTER — APPOINTMENT (OUTPATIENT)
Dept: ULTRASOUND IMAGING | Facility: CLINIC | Age: 28
End: 2023-04-24
Payer: MEDICAID

## 2023-04-24 PROCEDURE — G0279: CPT

## 2023-04-24 PROCEDURE — 76641 ULTRASOUND BREAST COMPLETE: CPT | Mod: 50

## 2023-04-24 PROCEDURE — 77066 DX MAMMO INCL CAD BI: CPT

## 2023-04-26 ENCOUNTER — NON-APPOINTMENT (OUTPATIENT)
Age: 28
End: 2023-04-26

## 2023-05-15 ENCOUNTER — TRANSCRIPTION ENCOUNTER (OUTPATIENT)
Age: 28
End: 2023-05-15

## 2023-05-22 ENCOUNTER — APPOINTMENT (OUTPATIENT)
Dept: INTERNAL MEDICINE | Facility: CLINIC | Age: 28
End: 2023-05-22

## 2023-05-23 ENCOUNTER — TRANSCRIPTION ENCOUNTER (OUTPATIENT)
Age: 28
End: 2023-05-23

## 2023-06-12 ENCOUNTER — APPOINTMENT (OUTPATIENT)
Dept: INTERNAL MEDICINE | Facility: CLINIC | Age: 28
End: 2023-06-12

## 2023-06-14 ENCOUNTER — TRANSCRIPTION ENCOUNTER (OUTPATIENT)
Age: 28
End: 2023-06-14

## 2023-06-15 ENCOUNTER — TRANSCRIPTION ENCOUNTER (OUTPATIENT)
Age: 28
End: 2023-06-15

## 2023-07-13 ENCOUNTER — TRANSCRIPTION ENCOUNTER (OUTPATIENT)
Age: 28
End: 2023-07-13

## 2023-09-11 ENCOUNTER — TRANSCRIPTION ENCOUNTER (OUTPATIENT)
Age: 28
End: 2023-09-11

## 2023-10-02 ENCOUNTER — APPOINTMENT (OUTPATIENT)
Dept: INTERNAL MEDICINE | Facility: CLINIC | Age: 28
End: 2023-10-02

## 2023-10-04 ENCOUNTER — TRANSCRIPTION ENCOUNTER (OUTPATIENT)
Age: 28
End: 2023-10-04

## 2023-11-16 ENCOUNTER — TRANSCRIPTION ENCOUNTER (OUTPATIENT)
Age: 28
End: 2023-11-16

## 2023-12-18 ENCOUNTER — APPOINTMENT (OUTPATIENT)
Dept: INTERNAL MEDICINE | Facility: CLINIC | Age: 28
End: 2023-12-18

## 2023-12-19 ENCOUNTER — TRANSCRIPTION ENCOUNTER (OUTPATIENT)
Age: 28
End: 2023-12-19

## 2024-01-11 ENCOUNTER — TRANSCRIPTION ENCOUNTER (OUTPATIENT)
Age: 29
End: 2024-01-11

## 2024-02-12 ENCOUNTER — APPOINTMENT (OUTPATIENT)
Dept: INTERNAL MEDICINE | Facility: CLINIC | Age: 29
End: 2024-02-12

## 2024-02-22 ENCOUNTER — TRANSCRIPTION ENCOUNTER (OUTPATIENT)
Age: 29
End: 2024-02-22

## 2024-02-22 RX ORDER — LEVETIRACETAM 1000 MG/1
1000 TABLET, FILM COATED ORAL TWICE DAILY
Qty: 60 | Refills: 5 | Status: ACTIVE | COMMUNITY
Start: 2022-08-25 | End: 1900-01-01

## 2024-02-26 ENCOUNTER — TRANSCRIPTION ENCOUNTER (OUTPATIENT)
Age: 29
End: 2024-02-26

## 2024-03-04 ENCOUNTER — APPOINTMENT (OUTPATIENT)
Dept: OBGYN | Facility: CLINIC | Age: 29
End: 2024-03-04

## 2024-03-05 ENCOUNTER — TRANSCRIPTION ENCOUNTER (OUTPATIENT)
Age: 29
End: 2024-03-05

## 2024-03-06 ENCOUNTER — TRANSCRIPTION ENCOUNTER (OUTPATIENT)
Age: 29
End: 2024-03-06

## 2024-03-25 ENCOUNTER — TRANSCRIPTION ENCOUNTER (OUTPATIENT)
Age: 29
End: 2024-03-25

## 2024-03-25 ENCOUNTER — APPOINTMENT (OUTPATIENT)
Dept: INTERNAL MEDICINE | Facility: CLINIC | Age: 29
End: 2024-03-25

## 2024-04-01 DIAGNOSIS — Z00.00 ENCOUNTER FOR GENERAL ADULT MEDICAL EXAMINATION W/OUT ABNORMAL FINDINGS: ICD-10-CM

## 2024-04-11 ENCOUNTER — APPOINTMENT (OUTPATIENT)
Dept: INTERNAL MEDICINE | Facility: CLINIC | Age: 29
End: 2024-04-11

## 2024-04-11 ENCOUNTER — TRANSCRIPTION ENCOUNTER (OUTPATIENT)
Age: 29
End: 2024-04-11

## 2024-04-12 ENCOUNTER — APPOINTMENT (OUTPATIENT)
Dept: OBGYN | Facility: CLINIC | Age: 29
End: 2024-04-12

## 2024-04-18 ENCOUNTER — TRANSCRIPTION ENCOUNTER (OUTPATIENT)
Age: 29
End: 2024-04-18

## 2024-04-18 ENCOUNTER — APPOINTMENT (OUTPATIENT)
Dept: OBGYN | Facility: CLINIC | Age: 29
End: 2024-04-18

## 2024-05-09 ENCOUNTER — TRANSCRIPTION ENCOUNTER (OUTPATIENT)
Age: 29
End: 2024-05-09

## 2024-05-13 ENCOUNTER — APPOINTMENT (OUTPATIENT)
Dept: INTERNAL MEDICINE | Facility: CLINIC | Age: 29
End: 2024-05-13

## 2024-05-20 ENCOUNTER — TRANSCRIPTION ENCOUNTER (OUTPATIENT)
Age: 29
End: 2024-05-20

## 2024-05-21 ENCOUNTER — APPOINTMENT (OUTPATIENT)
Dept: OBGYN | Facility: CLINIC | Age: 29
End: 2024-05-21

## 2024-05-23 NOTE — OB NEONATOLOGY/PEDIATRICIAN DELIVERY SUMMARY - NSPEDSNEONOTESA_OBGYN_ALL_OB_FT
Peds called to delivery for Cat II tracing. Male infant born at 37.1 wks via  to a 26 y/o  blood type O+ mother. Maternal history of gestational HTN and anxiety - on daily Zoloft otherwise prenatal history unremarkable. Prenatal labs nr/immune/-, GBS - on 10/14. AROM on 10/18 at 23:33 w/ clear fluids. Baby emerged vigorous, crying. Infant was brought to radiant warmer and warmed, dried, stimulated and suctioned. HR>100, normal respiratory effort. APGARS of 7/8. Mom is initiating formula feeding. Defers Hepatitis B vaccination. Desires for infant to be circumcised. EOS 0.11 (ROM 9hrs, GBS neg, TMax 36.6).     Due to persistent desaturations 70s and increased WOB CPAP initiated at 4 minutes of life. Patient transferred to NICU on CPAP 6, 30%. Statement Selected

## 2024-05-24 ENCOUNTER — TRANSCRIPTION ENCOUNTER (OUTPATIENT)
Age: 29
End: 2024-05-24

## 2024-06-10 ENCOUNTER — TRANSCRIPTION ENCOUNTER (OUTPATIENT)
Age: 29
End: 2024-06-10

## 2024-06-14 ENCOUNTER — APPOINTMENT (OUTPATIENT)
Dept: OBGYN | Facility: CLINIC | Age: 29
End: 2024-06-14

## 2024-07-08 ENCOUNTER — APPOINTMENT (OUTPATIENT)
Dept: OBGYN | Facility: CLINIC | Age: 29
End: 2024-07-08

## 2024-07-12 ENCOUNTER — TRANSCRIPTION ENCOUNTER (OUTPATIENT)
Age: 29
End: 2024-07-12

## 2024-07-22 ENCOUNTER — APPOINTMENT (OUTPATIENT)
Dept: INTERNAL MEDICINE | Facility: CLINIC | Age: 29
End: 2024-07-22

## 2024-07-22 ENCOUNTER — TRANSCRIPTION ENCOUNTER (OUTPATIENT)
Age: 29
End: 2024-07-22

## 2024-08-05 ENCOUNTER — TRANSCRIPTION ENCOUNTER (OUTPATIENT)
Age: 29
End: 2024-08-05

## 2024-08-09 ENCOUNTER — APPOINTMENT (OUTPATIENT)
Dept: OBGYN | Facility: CLINIC | Age: 29
End: 2024-08-09

## 2024-08-13 ENCOUNTER — TRANSCRIPTION ENCOUNTER (OUTPATIENT)
Age: 29
End: 2024-08-13

## 2024-08-13 ENCOUNTER — APPOINTMENT (OUTPATIENT)
Dept: OBGYN | Facility: CLINIC | Age: 29
End: 2024-08-13

## 2024-08-27 ENCOUNTER — TRANSCRIPTION ENCOUNTER (OUTPATIENT)
Age: 29
End: 2024-08-27

## 2024-08-28 ENCOUNTER — TRANSCRIPTION ENCOUNTER (OUTPATIENT)
Age: 29
End: 2024-08-28

## 2024-09-06 ENCOUNTER — APPOINTMENT (OUTPATIENT)
Dept: INTERNAL MEDICINE | Facility: CLINIC | Age: 29
End: 2024-09-06

## 2024-10-04 ENCOUNTER — APPOINTMENT (OUTPATIENT)
Dept: OBGYN | Facility: CLINIC | Age: 29
End: 2024-10-04

## 2024-10-21 ENCOUNTER — TRANSCRIPTION ENCOUNTER (OUTPATIENT)
Age: 29
End: 2024-10-21

## 2024-11-30 ENCOUNTER — TRANSCRIPTION ENCOUNTER (OUTPATIENT)
Age: 29
End: 2024-11-30

## 2024-12-02 ENCOUNTER — TRANSCRIPTION ENCOUNTER (OUTPATIENT)
Age: 29
End: 2024-12-02

## 2024-12-10 ENCOUNTER — APPOINTMENT (OUTPATIENT)
Dept: OBGYN | Facility: CLINIC | Age: 29
End: 2024-12-10

## 2024-12-17 ENCOUNTER — APPOINTMENT (OUTPATIENT)
Dept: INTERNAL MEDICINE | Facility: CLINIC | Age: 29
End: 2024-12-17

## 2024-12-17 ENCOUNTER — TRANSCRIPTION ENCOUNTER (OUTPATIENT)
Age: 29
End: 2024-12-17

## 2024-12-27 ENCOUNTER — TRANSCRIPTION ENCOUNTER (OUTPATIENT)
Age: 29
End: 2024-12-27

## 2025-01-20 ENCOUNTER — TRANSCRIPTION ENCOUNTER (OUTPATIENT)
Age: 30
End: 2025-01-20

## 2025-01-21 ENCOUNTER — TRANSCRIPTION ENCOUNTER (OUTPATIENT)
Age: 30
End: 2025-01-21

## 2025-01-22 ENCOUNTER — TRANSCRIPTION ENCOUNTER (OUTPATIENT)
Age: 30
End: 2025-01-22

## 2025-01-28 ENCOUNTER — TRANSCRIPTION ENCOUNTER (OUTPATIENT)
Age: 30
End: 2025-01-28

## 2025-02-04 ENCOUNTER — APPOINTMENT (OUTPATIENT)
Dept: OBGYN | Facility: CLINIC | Age: 30
End: 2025-02-04

## 2025-02-12 ENCOUNTER — APPOINTMENT (OUTPATIENT)
Dept: INTERNAL MEDICINE | Facility: CLINIC | Age: 30
End: 2025-02-12

## 2025-02-12 ENCOUNTER — TRANSCRIPTION ENCOUNTER (OUTPATIENT)
Age: 30
End: 2025-02-12

## 2025-02-18 ENCOUNTER — TRANSCRIPTION ENCOUNTER (OUTPATIENT)
Age: 30
End: 2025-02-18

## 2025-02-24 ENCOUNTER — APPOINTMENT (OUTPATIENT)
Dept: OBGYN | Facility: CLINIC | Age: 30
End: 2025-02-24

## 2025-03-11 ENCOUNTER — TRANSCRIPTION ENCOUNTER (OUTPATIENT)
Age: 30
End: 2025-03-11

## 2025-03-12 ENCOUNTER — TRANSCRIPTION ENCOUNTER (OUTPATIENT)
Age: 30
End: 2025-03-12

## 2025-03-17 ENCOUNTER — TRANSCRIPTION ENCOUNTER (OUTPATIENT)
Age: 30
End: 2025-03-17

## 2025-03-28 ENCOUNTER — TRANSCRIPTION ENCOUNTER (OUTPATIENT)
Age: 30
End: 2025-03-28

## 2025-03-31 ENCOUNTER — TRANSCRIPTION ENCOUNTER (OUTPATIENT)
Age: 30
End: 2025-03-31

## 2025-04-01 ENCOUNTER — TRANSCRIPTION ENCOUNTER (OUTPATIENT)
Age: 30
End: 2025-04-01

## 2025-04-07 ENCOUNTER — APPOINTMENT (OUTPATIENT)
Dept: OBGYN | Facility: CLINIC | Age: 30
End: 2025-04-07

## 2025-04-14 ENCOUNTER — TRANSCRIPTION ENCOUNTER (OUTPATIENT)
Age: 30
End: 2025-04-14

## 2025-04-15 ENCOUNTER — APPOINTMENT (OUTPATIENT)
Dept: NEUROLOGY | Facility: CLINIC | Age: 30
End: 2025-04-15

## 2025-04-16 ENCOUNTER — APPOINTMENT (OUTPATIENT)
Dept: INTERNAL MEDICINE | Facility: CLINIC | Age: 30
End: 2025-04-16

## 2025-04-22 ENCOUNTER — APPOINTMENT (OUTPATIENT)
Dept: OBGYN | Facility: CLINIC | Age: 30
End: 2025-04-22

## 2025-05-05 ENCOUNTER — TRANSCRIPTION ENCOUNTER (OUTPATIENT)
Age: 30
End: 2025-05-05

## 2025-05-30 ENCOUNTER — TRANSCRIPTION ENCOUNTER (OUTPATIENT)
Age: 30
End: 2025-05-30

## 2025-06-09 ENCOUNTER — APPOINTMENT (OUTPATIENT)
Dept: OBGYN | Facility: CLINIC | Age: 30
End: 2025-06-09

## 2025-06-09 ENCOUNTER — TRANSCRIPTION ENCOUNTER (OUTPATIENT)
Age: 30
End: 2025-06-09

## 2025-06-23 ENCOUNTER — APPOINTMENT (OUTPATIENT)
Dept: INTERNAL MEDICINE | Facility: CLINIC | Age: 30
End: 2025-06-23

## 2025-06-23 ENCOUNTER — TRANSCRIPTION ENCOUNTER (OUTPATIENT)
Age: 30
End: 2025-06-23

## 2025-06-25 ENCOUNTER — TRANSCRIPTION ENCOUNTER (OUTPATIENT)
Age: 30
End: 2025-06-25

## 2025-07-08 ENCOUNTER — TRANSCRIPTION ENCOUNTER (OUTPATIENT)
Age: 30
End: 2025-07-08

## 2025-07-08 ENCOUNTER — RX RENEWAL (OUTPATIENT)
Age: 30
End: 2025-07-08

## 2025-07-09 ENCOUNTER — TRANSCRIPTION ENCOUNTER (OUTPATIENT)
Age: 30
End: 2025-07-09

## 2025-07-11 ENCOUNTER — APPOINTMENT (OUTPATIENT)
Dept: OBGYN | Facility: CLINIC | Age: 30
End: 2025-07-11

## 2025-08-06 ENCOUNTER — RX RENEWAL (OUTPATIENT)
Age: 30
End: 2025-08-06

## 2025-08-25 ENCOUNTER — NON-APPOINTMENT (OUTPATIENT)
Age: 30
End: 2025-08-25

## 2025-08-26 ENCOUNTER — APPOINTMENT (OUTPATIENT)
Dept: OBGYN | Facility: CLINIC | Age: 30
End: 2025-08-26

## 2025-09-10 ENCOUNTER — APPOINTMENT (OUTPATIENT)
Dept: INTERNAL MEDICINE | Facility: CLINIC | Age: 30
End: 2025-09-10